# Patient Record
Sex: MALE | Race: WHITE
[De-identification: names, ages, dates, MRNs, and addresses within clinical notes are randomized per-mention and may not be internally consistent; named-entity substitution may affect disease eponyms.]

---

## 2017-02-01 ENCOUNTER — HOSPITAL ENCOUNTER (OUTPATIENT)
Dept: HOSPITAL 45 - C.LABMFLN | Age: 72
Discharge: HOME | End: 2017-02-01
Attending: FAMILY MEDICINE
Payer: COMMERCIAL

## 2017-02-01 DIAGNOSIS — N04.9: Primary | ICD-10-CM

## 2017-02-01 LAB
ANION GAP SERPL CALC-SCNC: 10 MMOL/L (ref 3–11)
APPEARANCE UR: CLEAR
BASOPHILS # BLD: 0.05 K/UL (ref 0–0.2)
BASOPHILS NFR BLD: 0.7 %
BILIRUB UR-MCNC: (no result) MG/DL
BUN SERPL-MCNC: 25 MG/DL (ref 7–18)
BUN/CREAT SERPL: 19 (ref 10–20)
CALCIUM SERPL-MCNC: 9 MG/DL (ref 8.5–10.1)
CHLORIDE SERPL-SCNC: 105 MMOL/L (ref 98–107)
CO2 SERPL-SCNC: 27 MMOL/L (ref 21–32)
COLLECT DURATION TIME UR: 24 HOURS
COLOR UR: YELLOW
COMPLETE: YES
CREAT SERPL-MCNC: 1.3 MG/DL (ref 0.6–1.4)
CREAT SERPL-MCNC: 1.3 MG/DL (ref 0.6–1.4)
CREAT UR-MCNC: 110 MG/DL
CREAT UR-MCNC: 76 MG/DL
EOSINOPHIL NFR BLD AUTO: 240 K/UL (ref 130–400)
GLUCOSE SERPL-MCNC: 95 MG/DL (ref 70–99)
HCT VFR BLD CALC: 40.7 % (ref 42–52)
IG%: 0.3 %
IMM GRANULOCYTES NFR BLD AUTO: 25.6 %
LYMPHOCYTES # BLD: 1.96 K/UL (ref 1.2–3.4)
MANUAL MICROSCOPIC REQUIRED?: NO
MCH RBC QN AUTO: 31.1 PG (ref 25–34)
MCHC RBC AUTO-ENTMCNC: 35.4 G/DL (ref 32–36)
MCV RBC AUTO: 87.9 FL (ref 80–100)
MONOCYTES NFR BLD: 9 %
NEUTROPHILS # BLD AUTO: 1.3 %
NEUTROPHILS NFR BLD AUTO: 63.1 %
NITRITE UR QL STRIP: (no result)
PATIENT HEIGHT: 188 CM
PH UR STRIP: 5.5 [PH] (ref 4.5–7.5)
PHOSPHATE SERPL-MCNC: 3.4 MG/DL (ref 2.5–4.9)
PMV BLD AUTO: 10.3 FL (ref 7.4–10.4)
POTASSIUM SERPL-SCNC: 3.7 MMOL/L (ref 3.5–5.1)
PROT UR STRIP-MCNC: 137.9 MG/DL (ref 0–11.9)
PROT UR STRIP-MCNC: 213.1 MG/DL (ref 0–11.9)
PROT UR STRIP.AUTO-MCNC: 3309.6 MG/24 HR (ref 0–149.1)
RBC # BLD AUTO: 4.63 M/UL (ref 4.7–6.1)
REVIEW REQ?: NO
SODIUM SERPL-SCNC: 142 MMOL/L (ref 136–145)
SP GR UR STRIP: 1.01 (ref 1–1.03)
URINE BILL WITH OR WITHOUT MIC: (no result)
URINE EPITHELIAL CELL AUTO: (no result) /LPF (ref 0–5)
URINE PROTIEN/CREAT RATIO: 1.9 (ref 0–0.2)
UROBILINOGEN UR-MCNC: (no result) MG/DL
WBC # BLD AUTO: 7.67 K/UL (ref 4.8–10.8)

## 2017-03-20 ENCOUNTER — HOSPITAL ENCOUNTER (OUTPATIENT)
Dept: HOSPITAL 45 - C.LABMFLN | Age: 72
Discharge: HOME | End: 2017-03-20
Attending: FAMILY MEDICINE
Payer: COMMERCIAL

## 2017-03-20 DIAGNOSIS — I10: ICD-10-CM

## 2017-03-20 DIAGNOSIS — E78.00: ICD-10-CM

## 2017-03-20 DIAGNOSIS — E11.9: ICD-10-CM

## 2017-03-20 DIAGNOSIS — R60.0: Primary | ICD-10-CM

## 2017-03-20 DIAGNOSIS — I48.91: ICD-10-CM

## 2017-03-20 LAB
ALBUMIN/GLOB SERPL: 0.6 {RATIO} (ref 0.9–2)
ALP SERPL-CCNC: 95 U/L (ref 45–117)
ALT SERPL-CCNC: 32 U/L (ref 12–78)
ANION GAP SERPL CALC-SCNC: 7 MMOL/L (ref 3–11)
AST SERPL-CCNC: 21 U/L (ref 15–37)
BUN SERPL-MCNC: 19 MG/DL (ref 7–18)
BUN/CREAT SERPL: 14.3 (ref 10–20)
CALCIUM SERPL-MCNC: 9 MG/DL (ref 8.5–10.1)
CHLORIDE SERPL-SCNC: 104 MMOL/L (ref 98–107)
CHOLEST/HDLC SERPL: 3.7 {RATIO}
CO2 SERPL-SCNC: 32 MMOL/L (ref 21–32)
CREAT SERPL-MCNC: 1.3 MG/DL (ref 0.6–1.4)
EST. AVERAGE GLUCOSE BLD GHB EST-MCNC: 183 MG/DL
GLOBULIN SER-MCNC: 4.8 GM/DL (ref 2.5–4)
GLUCOSE SERPL-MCNC: 65 MG/DL (ref 70–99)
GLUCOSE UR QL: 36 MG/DL
KETONES UR QL STRIP: 63 MG/DL
NITRITE UR QL STRIP: 176 MG/DL (ref 0–150)
PH UR: 134 MG/DL (ref 0–200)
POTASSIUM SERPL-SCNC: 3.8 MMOL/L (ref 3.5–5.1)
SODIUM SERPL-SCNC: 143 MMOL/L (ref 136–145)
VERY LOW DENSITY LIPOPROT CALC: 35 MG/DL

## 2017-03-24 NOTE — CODING QUERY MEDICAL NECESSITY
SUPPORTING DIAGNOSIS NEEDED



Dr. Jesus,



A supporting diagnosis is required for the test/procedure performed on this patient in 
order for us to be reimbursed by the patient's insurance. Please provide a supporting 
diagnosis for the following test/procedure listed below next to the test name along with 
your signature. 



*If there is no additional diagnosis for this patient that would support the following 
test/procedure please document that below next to the test/procedure.



Test(s)/Procedure(s) that require a supporting diagnosis:





* [id number] GLYCATED HEMOGLOBIN            DIAGNOSIS:





***DATE OF SERVICE: 3/20/17***





Provider Signature:  ______________________________  Date:  _______



Thank you  

Baljeet Dominguez

[facility] Information Management

Phone:  [phone]

Fax:  [phone]



Once completed, please kindly fax back to [phone]



For questions please call [phone]

## 2017-03-28 ENCOUNTER — HOSPITAL ENCOUNTER (OUTPATIENT)
Dept: HOSPITAL 45 - C.MRI | Age: 72
Discharge: HOME | End: 2017-03-28
Attending: FAMILY MEDICINE
Payer: COMMERCIAL

## 2017-03-28 DIAGNOSIS — M54.17: Primary | ICD-10-CM

## 2017-04-05 ENCOUNTER — HOSPITAL ENCOUNTER (OUTPATIENT)
Dept: HOSPITAL 45 - C.RAD | Age: 72
Discharge: HOME | End: 2017-04-05
Attending: FAMILY MEDICINE
Payer: COMMERCIAL

## 2017-04-05 DIAGNOSIS — M25.552: Primary | ICD-10-CM

## 2017-04-05 NOTE — DIAGNOSTIC IMAGING REPORT
LEFT HIP UNILATERAL 2 VIEWS



CLINICAL HISTORY: M25.552 Chronic left hip [id number] pain



COMPARISON: None.



DISCUSSION: Mild to moderate degenerative narrowing left hip joint space. No

evidence for acetabular protrusion. Slight sclerosis of the superior left

acetabular margin.



No abnormal periosteal reaction. There is no evidence for soft tissue swelling.



IMPRESSION: Moderate degenerative change. No acute process.







Electronically signed by:  Gabriel Pratt M.D.

4/5/2017 2:17 PM



Dictated Date/Time:  4/5/2017 2:17 PM

## 2017-05-08 ENCOUNTER — HOSPITAL ENCOUNTER (OUTPATIENT)
Dept: HOSPITAL 45 - C.LABMFLN | Age: 72
Discharge: HOME | End: 2017-05-08
Attending: FAMILY MEDICINE
Payer: COMMERCIAL

## 2017-05-08 DIAGNOSIS — E11.43: ICD-10-CM

## 2017-05-08 DIAGNOSIS — I48.91: Primary | ICD-10-CM

## 2017-05-08 DIAGNOSIS — E11.65: ICD-10-CM

## 2017-05-08 LAB
ANION GAP SERPL CALC-SCNC: 6 MMOL/L (ref 3–11)
BASOPHILS # BLD: 0.03 K/UL (ref 0–0.2)
BASOPHILS NFR BLD: 0.3 %
BUN SERPL-MCNC: 24 MG/DL (ref 7–18)
BUN/CREAT SERPL: 18.8 (ref 10–20)
CALCIUM SERPL-MCNC: 8.7 MG/DL (ref 8.5–10.1)
CHLORIDE SERPL-SCNC: 107 MMOL/L (ref 98–107)
CO2 SERPL-SCNC: 29 MMOL/L (ref 21–32)
COMPLETE: YES
CREAT SERPL-MCNC: 1.3 MG/DL (ref 0.6–1.4)
EOSINOPHIL NFR BLD AUTO: 256 K/UL (ref 130–400)
GLUCOSE SERPL-MCNC: 214 MG/DL (ref 70–99)
HCT VFR BLD CALC: 43.4 % (ref 42–52)
IG%: 1.7 %
IMM GRANULOCYTES NFR BLD AUTO: 22.3 %
LYMPHOCYTES # BLD: 2.41 K/UL (ref 1.2–3.4)
MCH RBC QN AUTO: 29.4 PG (ref 25–34)
MCHC RBC AUTO-ENTMCNC: 33.2 G/DL (ref 32–36)
MCV RBC AUTO: 88.6 FL (ref 80–100)
MONOCYTES NFR BLD: 8 %
NEUTROPHILS # BLD AUTO: 0.6 %
NEUTROPHILS NFR BLD AUTO: 67.1 %
PMV BLD AUTO: 9.8 FL (ref 7.4–10.4)
POTASSIUM SERPL-SCNC: 4.2 MMOL/L (ref 3.5–5.1)
RBC # BLD AUTO: 4.9 M/UL (ref 4.7–6.1)
SODIUM SERPL-SCNC: 142 MMOL/L (ref 136–145)
TSH SERPL-ACNC: 1.07 UIU/ML (ref 0.3–4.5)
WBC # BLD AUTO: 10.81 K/UL (ref 4.8–10.8)

## 2017-06-09 ENCOUNTER — HOSPITAL ENCOUNTER (OUTPATIENT)
Dept: HOSPITAL 45 - C.LABMFLN | Age: 72
Discharge: HOME | End: 2017-06-09
Attending: FAMILY MEDICINE
Payer: COMMERCIAL

## 2017-06-09 DIAGNOSIS — I48.91: ICD-10-CM

## 2017-06-09 DIAGNOSIS — I83.009: Primary | ICD-10-CM

## 2017-06-09 LAB
ANION GAP SERPL CALC-SCNC: 9 MMOL/L (ref 3–11)
BUN SERPL-MCNC: 27 MG/DL (ref 7–18)
BUN/CREAT SERPL: 18.2 (ref 10–20)
CALCIUM SERPL-MCNC: 9.3 MG/DL (ref 8.5–10.1)
CHLORIDE SERPL-SCNC: 104 MMOL/L (ref 98–107)
CO2 SERPL-SCNC: 29 MMOL/L (ref 21–32)
CREAT SERPL-MCNC: 1.5 MG/DL (ref 0.6–1.4)
GLUCOSE SERPL-MCNC: 119 MG/DL (ref 70–99)
INR PPP: 3.4 (ref 0.9–1.1)
POTASSIUM SERPL-SCNC: 4 MMOL/L (ref 3.5–5.1)
PROTHROMBIN TIME: 37.9 SECONDS (ref 9–12)
SODIUM SERPL-SCNC: 142 MMOL/L (ref 136–145)

## 2017-07-24 ENCOUNTER — HOSPITAL ENCOUNTER (OUTPATIENT)
Dept: HOSPITAL 45 - C.LABMFLN | Age: 72
Discharge: HOME | End: 2017-07-24
Attending: FAMILY MEDICINE
Payer: COMMERCIAL

## 2017-07-24 DIAGNOSIS — E11.65: ICD-10-CM

## 2017-07-24 DIAGNOSIS — I10: ICD-10-CM

## 2017-07-24 DIAGNOSIS — R53.82: ICD-10-CM

## 2017-07-24 DIAGNOSIS — I48.91: Primary | ICD-10-CM

## 2017-07-24 DIAGNOSIS — E11.43: ICD-10-CM

## 2017-07-24 DIAGNOSIS — E78.00: ICD-10-CM

## 2017-07-24 LAB
ALBUMIN/GLOB SERPL: 0.7 {RATIO} (ref 0.9–2)
ALP SERPL-CCNC: 97 U/L (ref 45–117)
ALT SERPL-CCNC: 36 U/L (ref 12–78)
ANION GAP SERPL CALC-SCNC: 6 MMOL/L (ref 3–11)
AST SERPL-CCNC: 21 U/L (ref 15–37)
BUN SERPL-MCNC: 21 MG/DL (ref 7–18)
BUN/CREAT SERPL: 17.7 (ref 10–20)
CALCIUM SERPL-MCNC: 9.3 MG/DL (ref 8.5–10.1)
CHLORIDE SERPL-SCNC: 104 MMOL/L (ref 98–107)
CHOLEST/HDLC SERPL: 3 {RATIO}
CO2 SERPL-SCNC: 30 MMOL/L (ref 21–32)
CREAT SERPL-MCNC: 1.2 MG/DL (ref 0.6–1.4)
GLOBULIN SER-MCNC: 4.3 GM/DL (ref 2.5–4)
GLUCOSE SERPL-MCNC: 104 MG/DL (ref 70–99)
GLUCOSE UR QL: 46 MG/DL
INR PPP: 2.2 (ref 0.9–1.1)
KETONES UR QL STRIP: 62 MG/DL
NITRITE UR QL STRIP: 145 MG/DL (ref 0–150)
PH UR: 137 MG/DL (ref 0–200)
POTASSIUM SERPL-SCNC: 3.9 MMOL/L (ref 3.5–5.1)
PROTHROMBIN TIME: 24.8 SECONDS (ref 9–12)
SODIUM SERPL-SCNC: 140 MMOL/L (ref 136–145)
VERY LOW DENSITY LIPOPROT CALC: 29 MG/DL

## 2017-07-25 LAB — EST. AVERAGE GLUCOSE BLD GHB EST-MCNC: 194 MG/DL

## 2017-07-31 ENCOUNTER — HOSPITAL ENCOUNTER (OUTPATIENT)
Dept: HOSPITAL 45 - C.LABMFLN | Age: 72
Discharge: HOME | End: 2017-07-31
Attending: INTERNAL MEDICINE
Payer: COMMERCIAL

## 2017-07-31 DIAGNOSIS — N18.9: Primary | ICD-10-CM

## 2017-07-31 LAB
APPEARANCE UR: CLEAR
BILIRUB UR-MCNC: (no result) MG/DL
COLOR UR: YELLOW
CREAT UR-MCNC: 130 MG/DL
MANUAL MICROSCOPIC REQUIRED?: NO
NITRITE UR QL STRIP: (no result)
PH UR STRIP: 5.5 [PH] (ref 4.5–7.5)
PROT UR STRIP-MCNC: 258.3 MG/DL (ref 0–11.9)
REVIEW REQ?: YES
SP GR UR STRIP: 1.02 (ref 1–1.03)
URINE BILL WITH OR WITHOUT MIC: (no result)
URINE EPITHELIAL CELL AUTO: >30 /LPF (ref 0–5)
URINE PROTIEN/CREAT RATIO: 2 (ref 0–0.2)
UROBILINOGEN UR-MCNC: (no result) MG/DL

## 2017-09-12 ENCOUNTER — HOSPITAL ENCOUNTER (OUTPATIENT)
Dept: HOSPITAL 45 - C.LABMFLN | Age: 72
Discharge: HOME | End: 2017-09-12
Attending: FAMILY MEDICINE
Payer: COMMERCIAL

## 2017-09-12 DIAGNOSIS — E11.43: ICD-10-CM

## 2017-09-12 DIAGNOSIS — R53.82: ICD-10-CM

## 2017-09-12 DIAGNOSIS — E11.65: ICD-10-CM

## 2017-09-12 DIAGNOSIS — I48.91: Primary | ICD-10-CM

## 2017-09-12 LAB
BASOPHILS # BLD: 0.03 K/UL (ref 0–0.2)
BASOPHILS NFR BLD: 0.4 %
COMPLETE: YES
EOSINOPHIL NFR BLD AUTO: 262 K/UL (ref 130–400)
HCT VFR BLD CALC: 40.8 % (ref 42–52)
IG%: 0.5 %
IMM GRANULOCYTES NFR BLD AUTO: 29.3 %
LYMPHOCYTES # BLD: 2.25 K/UL (ref 1.2–3.4)
MCH RBC QN AUTO: 30.3 PG (ref 25–34)
MCHC RBC AUTO-ENTMCNC: 34.8 G/DL (ref 32–36)
MCV RBC AUTO: 87.2 FL (ref 80–100)
MONOCYTES NFR BLD: 10.3 %
NEUTROPHILS # BLD AUTO: 1 %
NEUTROPHILS NFR BLD AUTO: 58.5 %
PMV BLD AUTO: 10.4 FL (ref 7.4–10.4)
RBC # BLD AUTO: 4.68 M/UL (ref 4.7–6.1)
WBC # BLD AUTO: 7.68 K/UL (ref 4.8–10.8)

## 2017-09-18 ENCOUNTER — HOSPITAL ENCOUNTER (OUTPATIENT)
Dept: HOSPITAL 45 - C.ULTR | Age: 72
Discharge: HOME | End: 2017-09-18
Attending: FAMILY MEDICINE
Payer: COMMERCIAL

## 2017-09-18 ENCOUNTER — HOSPITAL ENCOUNTER (OUTPATIENT)
Dept: HOSPITAL 45 - C.LABMFLN | Age: 72
Discharge: HOME | End: 2017-09-18
Attending: FAMILY MEDICINE
Payer: COMMERCIAL

## 2017-09-18 DIAGNOSIS — R32: Primary | ICD-10-CM

## 2017-09-18 DIAGNOSIS — N04.9: Primary | ICD-10-CM

## 2017-09-18 DIAGNOSIS — R32: ICD-10-CM

## 2017-09-18 LAB
APPEARANCE UR: CLEAR
BILIRUB UR-MCNC: (no result) MG/DL
COLOR UR: YELLOW
CREAT UR-MCNC: 180 MG/DL
MANUAL MICROSCOPIC REQUIRED?: NO
NITRITE UR QL STRIP: (no result)
PH UR STRIP: 5 [PH] (ref 4.5–7.5)
PROT UR STRIP-MCNC: 289.6 MG/DL (ref 0–11.9)
REVIEW REQ?: NO
SP GR UR STRIP: 1.02 (ref 1–1.03)
URINE BILL WITH OR WITHOUT MIC: (no result)
URINE EPITHELIAL CELL AUTO: (no result) /LPF (ref 0–5)
URINE PROTIEN/CREAT RATIO: 1.6 (ref 0–0.2)
UROBILINOGEN UR-MCNC: (no result) MG/DL

## 2017-09-18 NOTE — DIAGNOSTIC IMAGING REPORT
(LIZETTE) RETROPERITONEAL LTD



CLINICAL HISTORY: 72 years-old Male presenting with URINARY INCONTINENCE. 



TECHNIQUE: Real-time grayscale and limited color Doppler ultrasound imaging of

the bladder was performed. 



COMPARISON: None.



FINDINGS:



Bladder: Bladder jets not visualized. Bladder is incompletely distended limiting

diagnostic sensitivity. No gross evidence of bladder wall thickening. Post void

residual volume measures 16.7 mL, which is likely within the range of normal.



Other: None.



IMPRESSION:  

1.  Post void residual volume measures 16.7 mL, which is likely within the range

of normal. 







Electronically signed by:  Rodriguez Chaves M.D.

9/18/2017 5:23 PM



Dictated Date/Time:  9/18/2017 5:20 PM

## 2017-10-09 ENCOUNTER — HOSPITAL ENCOUNTER (OUTPATIENT)
Age: 72
Discharge: HOME | End: 2017-10-09
Attending: PSYCHIATRY & NEUROLOGY
Payer: COMMERCIAL

## 2017-10-09 DIAGNOSIS — Z86.73: ICD-10-CM

## 2017-10-09 DIAGNOSIS — R26.9: Primary | ICD-10-CM

## 2017-10-09 NOTE — DIAGNOSTIC IMAGING REPORT
BRAIN WITHOUT CONTRAST



CLINICAL HISTORY: 72 years-old Male presenting with ABNORMAL GAIT. 



TECHNIQUE: Multisequence, multiplanar MR imaging of the brain was performed

without the use of intravenous contrast. IV contrast: None.



COMPARISON: None.



FINDINGS: 

Proportional ventricular and sulcal prominence, likely age-related parenchymal

volume loss. Periventricular and subcortical white matter T2/FLAIR

hyperintensity, nonspecific but likely indicative of chronic small vessel

ischemic change. No mass effect or midline shift. No restricted diffusion to

suggest acute ischemia. Intrinsic T1 hyperintensity associated with a focal

defect in the right basal ganglia consistent with an old lacunar infarct with

mineralization. Resultant relative atrophy of the right caudate. No convincing

evidence of acute hemorrhage. No extra-axial fluid collection. 



T2 skull base flow voids preserved.    



Bone marrow signal intensity within the calvarium within normal limits. Minimal

polypoid mucosal thickening in the left maxillary sinus.



IMPRESSION:  

1.  No acute intracranial abnormality. 



2.  Chronic small vessel schema change.



3.  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







Electronically signed by:  Rodriguez Chaves M.D.

10/9/2017 5:32 PM



Dictated Date/Time:  10/9/2017 5:27 PM

## 2017-11-22 ENCOUNTER — HOSPITAL ENCOUNTER (OUTPATIENT)
Dept: HOSPITAL 45 - C.LABMFLN | Age: 72
Discharge: HOME | End: 2017-11-22
Attending: PHYSICIAN ASSISTANT
Payer: COMMERCIAL

## 2017-11-22 DIAGNOSIS — E11.65: Primary | ICD-10-CM

## 2017-11-23 LAB — EST. AVERAGE GLUCOSE BLD GHB EST-MCNC: 160 MG/DL

## 2018-01-24 ENCOUNTER — HOSPITAL ENCOUNTER (OUTPATIENT)
Dept: HOSPITAL 45 - C.LABMFLN | Age: 73
Discharge: HOME | End: 2018-01-24
Attending: INTERNAL MEDICINE
Payer: COMMERCIAL

## 2018-01-24 DIAGNOSIS — N04.9: Primary | ICD-10-CM

## 2018-01-24 LAB
ALBUMIN SERPL-MCNC: 3 GM/DL (ref 3.4–5)
BUN SERPL-MCNC: 19 MG/DL (ref 7–18)
CALCIUM SERPL-MCNC: 8.9 MG/DL (ref 8.5–10.1)
CO2 SERPL-SCNC: 30 MMOL/L (ref 21–32)
CREAT SERPL-MCNC: 1.37 MG/DL (ref 0.6–1.4)
GLUCOSE SERPL-MCNC: 126 MG/DL (ref 70–99)
PHOSPHATE SERPL-MCNC: 2.5 MG/DL (ref 2.5–4.9)
POTASSIUM SERPL-SCNC: 3.5 MMOL/L (ref 3.5–5.1)
SODIUM SERPL-SCNC: 140 MMOL/L (ref 136–145)

## 2018-02-06 ENCOUNTER — HOSPITAL ENCOUNTER (OUTPATIENT)
Dept: HOSPITAL 45 - C.LABMFLN | Age: 73
Discharge: HOME | End: 2018-02-06
Attending: FAMILY MEDICINE
Payer: COMMERCIAL

## 2018-02-06 DIAGNOSIS — I10: Primary | ICD-10-CM

## 2018-02-06 LAB
ALBUMIN SERPL-MCNC: 3.1 GM/DL (ref 3.4–5)
BUN SERPL-MCNC: 25 MG/DL (ref 7–18)
CALCIUM SERPL-MCNC: 9.1 MG/DL (ref 8.5–10.1)
CO2 SERPL-SCNC: 28 MMOL/L (ref 21–32)
CREAT SERPL-MCNC: 1.65 MG/DL (ref 0.6–1.4)
GLUCOSE SERPL-MCNC: 168 MG/DL (ref 70–99)
PHOSPHATE SERPL-MCNC: 2.9 MG/DL (ref 2.5–4.9)
POTASSIUM SERPL-SCNC: 3.8 MMOL/L (ref 3.5–5.1)
SODIUM SERPL-SCNC: 137 MMOL/L (ref 136–145)

## 2018-03-14 ENCOUNTER — HOSPITAL ENCOUNTER (OUTPATIENT)
Dept: HOSPITAL 45 - C.LABMFLN | Age: 73
Discharge: HOME | End: 2018-03-14
Attending: FAMILY MEDICINE
Payer: COMMERCIAL

## 2018-03-14 DIAGNOSIS — E11.9: Primary | ICD-10-CM

## 2018-03-14 DIAGNOSIS — N17.9: ICD-10-CM

## 2018-03-14 LAB
ALBUMIN SERPL-MCNC: 3 GM/DL (ref 3.4–5)
BUN SERPL-MCNC: 18 MG/DL (ref 7–18)
CALCIUM SERPL-MCNC: 8.8 MG/DL (ref 8.5–10.1)
CO2 SERPL-SCNC: 29 MMOL/L (ref 21–32)
CREAT SERPL-MCNC: 1.26 MG/DL (ref 0.6–1.4)
GLUCOSE SERPL-MCNC: 158 MG/DL (ref 70–99)
HBA1C MFR BLD: 7.6 % (ref 4.5–5.6)
PHOSPHATE SERPL-MCNC: 3.1 MG/DL (ref 2.5–4.9)
POTASSIUM SERPL-SCNC: 3.5 MMOL/L (ref 3.5–5.1)
SODIUM SERPL-SCNC: 140 MMOL/L (ref 136–145)

## 2018-05-21 ENCOUNTER — HOSPITAL ENCOUNTER (OUTPATIENT)
Dept: HOSPITAL 45 - C.LABMFLN | Age: 73
Discharge: HOME | End: 2018-05-21
Attending: FAMILY MEDICINE
Payer: COMMERCIAL

## 2018-05-21 DIAGNOSIS — I48.91: Primary | ICD-10-CM

## 2018-05-21 DIAGNOSIS — R60.0: ICD-10-CM

## 2018-05-21 DIAGNOSIS — R06.09: ICD-10-CM

## 2018-05-21 DIAGNOSIS — R06.00: ICD-10-CM

## 2018-05-21 LAB
BASOPHILS # BLD: 0.06 K/UL (ref 0–0.2)
BASOPHILS NFR BLD: 0.7 %
BUN SERPL-MCNC: 26 MG/DL (ref 7–18)
CALCIUM SERPL-MCNC: 9.2 MG/DL (ref 8.5–10.1)
CO2 SERPL-SCNC: 31 MMOL/L (ref 21–32)
CREAT SERPL-MCNC: 1.5 MG/DL (ref 0.6–1.4)
EOS ABS #: 0.13 K/UL (ref 0–0.5)
EOSINOPHIL NFR BLD AUTO: 256 K/UL (ref 130–400)
GLUCOSE SERPL-MCNC: 47 MG/DL (ref 70–99)
HCT VFR BLD CALC: 42.2 % (ref 42–52)
HGB BLD-MCNC: 14.8 G/DL (ref 14–18)
IG#: 0.03 K/UL (ref 0–0.02)
IMM GRANULOCYTES NFR BLD AUTO: 25.2 %
INR PPP: 2 (ref 0.9–1.1)
LYMPHOCYTES # BLD: 2.1 K/UL (ref 1.2–3.4)
MCH RBC QN AUTO: 31 PG (ref 25–34)
MCHC RBC AUTO-ENTMCNC: 35.1 G/DL (ref 32–36)
MCV RBC AUTO: 88.5 FL (ref 80–100)
MONO ABS #: 0.88 K/UL (ref 0.11–0.59)
MONOCYTES NFR BLD: 10.6 %
NEUT ABS #: 5.12 K/UL (ref 1.4–6.5)
NEUTROPHILS # BLD AUTO: 1.6 %
NEUTROPHILS NFR BLD AUTO: 61.5 %
PMV BLD AUTO: 10.6 FL (ref 7.4–10.4)
POTASSIUM SERPL-SCNC: 3.5 MMOL/L (ref 3.5–5.1)
RED CELL DISTRIBUTION WIDTH CV: 13.5 % (ref 11.5–14.5)
RED CELL DISTRIBUTION WIDTH SD: 44.5 FL (ref 36.4–46.3)
SODIUM SERPL-SCNC: 142 MMOL/L (ref 136–145)
WBC # BLD AUTO: 8.32 K/UL (ref 4.8–10.8)

## 2018-07-23 ENCOUNTER — HOSPITAL ENCOUNTER (OUTPATIENT)
Dept: HOSPITAL 45 - C.LABMFLN | Age: 73
Discharge: HOME | End: 2018-07-23
Attending: FAMILY MEDICINE
Payer: COMMERCIAL

## 2018-07-23 DIAGNOSIS — E11.628: ICD-10-CM

## 2018-07-23 DIAGNOSIS — I48.91: Primary | ICD-10-CM

## 2018-07-23 DIAGNOSIS — I10: ICD-10-CM

## 2018-07-23 DIAGNOSIS — L03.115: ICD-10-CM

## 2018-07-23 DIAGNOSIS — M79.671: ICD-10-CM

## 2018-07-23 LAB
BASOPHILS # BLD: 0.03 K/UL (ref 0–0.2)
BASOPHILS NFR BLD: 0.3 %
BUN SERPL-MCNC: 24 MG/DL (ref 7–18)
CALCIUM SERPL-MCNC: 8.4 MG/DL (ref 8.5–10.1)
CO2 SERPL-SCNC: 28 MMOL/L (ref 21–32)
CREAT SERPL-MCNC: 1.62 MG/DL (ref 0.6–1.4)
EOS ABS #: 0.1 K/UL (ref 0–0.5)
EOSINOPHIL NFR BLD AUTO: 204 K/UL (ref 130–400)
GLUCOSE SERPL-MCNC: 239 MG/DL (ref 70–99)
HCT VFR BLD CALC: 38.7 % (ref 42–52)
HGB BLD-MCNC: 13 G/DL (ref 14–18)
IG#: 0.04 K/UL (ref 0–0.02)
IMM GRANULOCYTES NFR BLD AUTO: 23.2 %
INR PPP: 2.8 (ref 0.9–1.1)
LYMPHOCYTES # BLD: 2.16 K/UL (ref 1.2–3.4)
MCH RBC QN AUTO: 29.6 PG (ref 25–34)
MCHC RBC AUTO-ENTMCNC: 33.6 G/DL (ref 32–36)
MCV RBC AUTO: 88.2 FL (ref 80–100)
MONO ABS #: 0.78 K/UL (ref 0.11–0.59)
MONOCYTES NFR BLD: 8.4 %
NEUT ABS #: 6.21 K/UL (ref 1.4–6.5)
NEUTROPHILS # BLD AUTO: 1.1 %
NEUTROPHILS NFR BLD AUTO: 66.6 %
PMV BLD AUTO: 11.1 FL (ref 7.4–10.4)
POTASSIUM SERPL-SCNC: 3.9 MMOL/L (ref 3.5–5.1)
RED CELL DISTRIBUTION WIDTH CV: 14 % (ref 11.5–14.5)
RED CELL DISTRIBUTION WIDTH SD: 45.2 FL (ref 36.4–46.3)
SODIUM SERPL-SCNC: 138 MMOL/L (ref 136–145)
URATE SERPL-MCNC: 7.2 MG/DL (ref 2.6–7.2)
WBC # BLD AUTO: 9.32 K/UL (ref 4.8–10.8)

## 2018-07-27 ENCOUNTER — HOSPITAL ENCOUNTER (INPATIENT)
Dept: HOSPITAL 45 - C.EDB | Age: 73
LOS: 3 days | Discharge: HOME | DRG: 394 | End: 2018-07-30
Attending: FAMILY MEDICINE | Admitting: STUDENT IN AN ORGANIZED HEALTH CARE EDUCATION/TRAINING PROGRAM
Payer: COMMERCIAL

## 2018-07-27 VITALS
HEIGHT: 73 IN | BODY MASS INDEX: 32.4 KG/M2 | WEIGHT: 244.49 LBS | HEIGHT: 73 IN | BODY MASS INDEX: 32.4 KG/M2 | WEIGHT: 244.49 LBS

## 2018-07-27 VITALS
DIASTOLIC BLOOD PRESSURE: 117 MMHG | SYSTOLIC BLOOD PRESSURE: 231 MMHG | OXYGEN SATURATION: 98 % | HEART RATE: 93 BPM | TEMPERATURE: 98.6 F

## 2018-07-27 DIAGNOSIS — Z86.718: ICD-10-CM

## 2018-07-27 DIAGNOSIS — L03.116: ICD-10-CM

## 2018-07-27 DIAGNOSIS — D62: ICD-10-CM

## 2018-07-27 DIAGNOSIS — K92.1: ICD-10-CM

## 2018-07-27 DIAGNOSIS — K59.00: ICD-10-CM

## 2018-07-27 DIAGNOSIS — K64.8: Primary | ICD-10-CM

## 2018-07-27 DIAGNOSIS — E87.6: ICD-10-CM

## 2018-07-27 DIAGNOSIS — K57.30: ICD-10-CM

## 2018-07-27 DIAGNOSIS — D68.59: ICD-10-CM

## 2018-07-27 DIAGNOSIS — M48.062: ICD-10-CM

## 2018-07-27 DIAGNOSIS — Z79.01: ICD-10-CM

## 2018-07-27 DIAGNOSIS — Z79.84: ICD-10-CM

## 2018-07-27 DIAGNOSIS — N18.9: ICD-10-CM

## 2018-07-27 DIAGNOSIS — E11.21: ICD-10-CM

## 2018-07-27 DIAGNOSIS — I12.9: ICD-10-CM

## 2018-07-27 DIAGNOSIS — N40.0: ICD-10-CM

## 2018-07-27 DIAGNOSIS — E78.5: ICD-10-CM

## 2018-07-27 LAB
ALBUMIN SERPL-MCNC: 2.8 GM/DL (ref 3.4–5)
ALP SERPL-CCNC: 84 U/L (ref 45–117)
ALT SERPL-CCNC: 19 U/L (ref 12–78)
AST SERPL-CCNC: 20 U/L (ref 15–37)
BASOPHILS # BLD: 0.05 K/UL (ref 0–0.2)
BASOPHILS NFR BLD: 0.6 %
BUN SERPL-MCNC: 26 MG/DL (ref 7–18)
CA-I BLD-SCNC: 1.17 MMOL/L (ref 1.12–1.32)
CALCIUM SERPL-MCNC: 8.6 MG/DL (ref 8.5–10.1)
CO2 SERPL-SCNC: 22 MMOL/L (ref 21–32)
CREAT BLD-MCNC: 1.5 MG/DL (ref 0.6–1.3)
CREAT SERPL-MCNC: 1.53 MG/DL (ref 0.6–1.4)
EOS ABS #: 0.16 K/UL (ref 0–0.5)
EOSINOPHIL NFR BLD AUTO: 258 K/UL (ref 130–400)
GLUCOSE SERPL-MCNC: 166 MG/DL (ref 70–99)
HCT VFR BLD CALC: 25.5 % (ref 42–52)
HCT VFR BLD CALC: 27.7 % (ref 42–52)
HGB BLD-MCNC: 8.8 G/DL (ref 14–18)
HGB BLD-MCNC: 9.2 G/DL (ref 14–18)
IG#: 0.05 K/UL (ref 0–0.02)
IMM GRANULOCYTES NFR BLD AUTO: 29.8 %
INR PPP: 3.2 (ref 0.9–1.1)
ISTAT POTASSIUM: 4 MEQ/L (ref 3.3–5)
LYMPHOCYTES # BLD: 2.37 K/UL (ref 1.2–3.4)
MCH RBC QN AUTO: 28.8 PG (ref 25–34)
MCHC RBC AUTO-ENTMCNC: 33.2 G/DL (ref 32–36)
MCV RBC AUTO: 86.6 FL (ref 80–100)
MONO ABS #: 0.74 K/UL (ref 0.11–0.59)
MONOCYTES NFR BLD: 9.3 %
NEUT ABS #: 4.57 K/UL (ref 1.4–6.5)
NEUTROPHILS # BLD AUTO: 2 %
NEUTROPHILS NFR BLD AUTO: 57.7 %
PMV BLD AUTO: 9.9 FL (ref 7.4–10.4)
POTASSIUM SERPL-SCNC: 4 MMOL/L (ref 3.5–5.1)
PROT SERPL-MCNC: 7 GM/DL (ref 6.4–8.2)
PTT PATIENT: 35.1 SECONDS (ref 21–31)
RED CELL DISTRIBUTION WIDTH CV: 13.8 % (ref 11.5–14.5)
RED CELL DISTRIBUTION WIDTH SD: 43.7 FL (ref 36.4–46.3)
SODIUM BLD-SCNC: 142 MEQ/L (ref 135–144)
SODIUM SERPL-SCNC: 141 MMOL/L (ref 136–145)
WBC # BLD AUTO: 7.94 K/UL (ref 4.8–10.8)

## 2018-07-27 RX ADMIN — INSULIN ASPART SCH UNITS: 100 INJECTION, SOLUTION INTRAVENOUS; SUBCUTANEOUS at 23:43

## 2018-07-27 RX ADMIN — SIMETHICONE SCH MG: 80 TABLET, CHEWABLE ORAL at 23:41

## 2018-07-27 RX ADMIN — METOPROLOL TARTRATE PRN MG: 5 INJECTION, SOLUTION INTRAVENOUS at 23:14

## 2018-07-27 RX ADMIN — PANTOPRAZOLE SODIUM SCH MLS/HR: 40 INJECTION, POWDER, FOR SOLUTION INTRAVENOUS at 23:13

## 2018-07-27 NOTE — HISTORY AND PHYSICAL
History & Physical


Date & Time of Service:


Jul 27, 2018 at 20:36


Chief Complaint:


Cellulitis, Internal Bleeding, Referred By Dr


Primary Care Physician:


Fabiano Jesus M.D.


History of Present Illness


Source:  patient, family


Patient is a 73 year old male with a past medical history of Diabetes, 

Hypertension, and Protein C Deficiency with 2 previous DVTs in the left leg 

that presents with dark stools x 3 days. The patient states that he first 

noticed his dark stools on Tuesday morning and they have continued to be dark 

throughout the week. The patient states that they have appeared black with 

blood surrounding them in the toilet. He also appreciates his bowel movements 

have been looser than normal. He is normally constipated and had a hard bowel 

movement Monday evening where he had to strain but has not had significant 

bleeding with wiping since. The patient is on Coumadin for a history of 

multiple DVTs and Protein C Deficiency. His last INR this week was around 2 (

patient states does not recall exact number).





They patient has also been on antibiotics this week for a left lower extremity 

cellulitis. His symptoms were initially believed to be secondary to Doxycycline 

use so they switched his antibiotics to Clindamycin. The patient states that 

there was erythema over the dorsum of his foot along with pain when moving his 

foot initially. The pain improved with the Clindamycin but her continues to 

have some residual erythema and swelling of the left lower extremity.





He appreciates his blood pressures over the last 2 weeks have ranged from 170-

220 systolic, and called his PCP who told him to "try to make it better" at 

home.





The patient denies an recent fevers, chills, sweats, abdominal pain, diarrhea, 

vomiting, nausea, or any other acute complaints. He states that he has been 

fatigued but this has been chronic over the last year.





Past Medical/Surgical History


Medical Problems:


(1) Bleeding on Coumadin


(2) Diabetes


(3) GI bleed


(4) Hypertension


(5) Kidney disease


(6) Lumbar stenosis with neurogenic claudication


(7) Pneumonia


(8) Skin problem


(9) Syncope








Family History





Cancer





Social History


Smoking Status:  Never Smoker


Smokeless Tobacco Use:  No


Alcohol Use:  none


Drug Use:  none


Occupational Status:  retired





Immunizations


History of Influenza Vaccine:  Unknown


History of Tetanus Vaccine?:  Unknown


History of Pneumococcal:  Unknown


History of Hepatitis B Vaccine:  Unknown





Allergies


Coded Allergies:  


     Penicillins (Verified  Allergy, Unknown, RASH FROM INJECTED FORM ONLY, po 

OK, 12/14/15)


 PT CAN TOLERATE ALL ORAL PCN MEDS


     Pravastatin (Unverified  Adverse Reaction, Unknown, LEGS GET WEAK, 12/14/15

)





Home Medications


Scheduled


Acetaminophen (Tylenol), 1-2 TABS PO DAILY


Amlodipine (Norvasc), 10 MG PO QPM


Aspirin (Aspirin Ec), 81 MG PO HOLD


Atorvastatin (Lipitor), 20 MG PO HS


Bumetanide (Bumetanide), 2 MG PO DAILY


Carbidopa-Levodopa (Carbidopa/Levodopa), 1 TAB PO DAILY


Clindamycin HCl (Clindamycin HCl), 300 MG PO QID


Coenzyme Q10 (Ubidecarenone) (Coq10), 200 MG QAM


Ginkgo Biloba (Ginkgo Biloba), 120 MG PO DAILY


Insulin Aspart (Novolog Flexpen), 30 UNITS SQ QAM


Insulin Aspart (Novolog Flexpen), 35 UNITS SQ BIDM


Insulin Glargine (Toujeo Solostar), 65-75 UNITS SQ HS


Losartan Potassium (Cozaar), 100 MG PO DAILY


Metformin HCl (Metformin HCl), 500 MG PO BID


Multiple Vitamin (Multi Vitamin Daily), 1 TAB PO QAM


Sotalol Hcl (Sotalol Hcl), 120 MG PO BID


Terazosin Hcl (Hytrin), 10 MG PO HS


Vitamin B Cmplx/Vitc/Folic Ac (Nephrocaps), 1 CAP PO DAILY


Warfarin Sod (Jantoven), 5 MG PO MWF


Warfarin Sod (Jantoven), 2.5 MG PO 4XWK


[Fluocinonide Sol], 1 DOSE TOP PRN





Scheduled PRN


Fluticasone Propionate (Nasal) (Flonase Allergy Relief), 2 SPRAYS ISABEL DAILY PRN 

for Nasal Congestion


Hydrocodone/Acetaminophen 5MG/325MG (Norco 5MG/325MG), 1-2 TABS PO QID PRN for 

Pain





Review of Systems


Constitutional:  + fatigue, No fever, No chills, No sweats, No weight loss


Respiratory:  No cough, No sputum, No wheezing, No shortness of breath, No 

dyspnea on exertion


Cardiovascular:  No chest pain, No palpitations


Abdomen:  + GI bleeding, No pain, No nausea, No vomiting, No diarrhea, No 

constipation


Musculoskeletal:  + swelling, No joint pain, No calf pain


Neurologic:  No paralysis, No weakness


Psychiatric:  No anxiety, No insomnia


Integumentary:  + rash, No itch





Physical Exam


Vital Signs











  Date Time  Temp Pulse Resp B/P (MAP) Pulse Ox O2 Delivery O2 Flow Rate FiO2


 


7/27/18 19:30  83 20 188/105 97 Room Air  


 


7/27/18 19:26  84      


 


7/27/18 17:56 36.9 88 18 216/89 98 Room Air  








General Appearance:  WD/WN, + obese


Head:  normocephalic, atraumatic


Eyes:  normal inspection, sclerae normal


Neck:  supple, no carotid bruits


Respiratory/Chest:  chest non-tender, lungs clear, normal breath sounds, no 

respiratory distress, no accessory muscle use


Cardiovascular:  regular rate, rhythm, no edema, no gallop, no murmur


Abdomen/GI:  normal bowel sounds, non tender, soft, + distended (Appear 

distended secondary to gas), + fecal occult blood


Extremities/Musculoskelatal:  + pedal edema, + swelling (2+ lower extremity 

edema), + pertinent finding (Erytnema over the dosrum of the right foot 

extending to the lower leg)


Neurologic/Psych:  alert, normal mood/affect, oriented x 3





Diagnostics


Laboratory Results





Results Past 24 Hours








Test


  7/27/18


18:26 7/27/18


18:40 Range/Units


 


 


White Blood Count 7.94  4.8-10.8  K/uL


 


Red Blood Count 3.20  4.7-6.1  M/uL


 


Hemoglobin 9.2  14.0-18.0  g/dL


 


Hematocrit 27.7  42-52  %


 


Mean Corpuscular Volume 86.6    fL


 


Mean Corpuscular Hemoglobin 28.8  25-34  pg


 


Mean Corpuscular Hemoglobin


Concent 33.2


  


  32-36  g/dl


 


 


Platelet Count 258  130-400  K/uL


 


Mean Platelet Volume 9.9  7.4-10.4  fL


 


Neutrophils (%) (Auto) 57.7   %


 


Lymphocytes (%) (Auto) 29.8   %


 


Monocytes (%) (Auto) 9.3   %


 


Eosinophils (%) (Auto) 2.0   %


 


Basophils (%) (Auto) 0.6   %


 


Neutrophils # (Auto) 4.57  1.4-6.5  K/uL


 


Lymphocytes # (Auto) 2.37  1.2-3.4  K/uL


 


Monocytes # (Auto) 0.74  0.11-0.59  K/uL


 


Eosinophils # (Auto) 0.16  0-0.5  K/uL


 


Basophils # (Auto) 0.05  0-0.2  K/uL


 


RDW Standard Deviation 43.7  36.4-46.3  fL


 


RDW Coefficient of Variation 13.8  11.5-14.5  %


 


Immature Granulocyte % (Auto) 0.6   %


 


Immature Granulocyte # (Auto) 0.05  0.00-0.02  K/uL


 


Prothrombin Time


  33.1


  


  9.0-12.0


SECONDS


 


Prothromb Time International


Ratio 3.2


  


  0.9-1.1  


 


 


Activated Partial


Thromboplast Time 35.1


  


  21.0-31.0


SECONDS


 


Partial Thromboplastin Ratio 1.4   


 


Sodium Level 141  136-145  mmol/L


 


Potassium Level 4.0  3.5-5.1  mmol/L


 


Chloride Level 110    mmol/L


 


Carbon Dioxide Level 22  21-32  mmol/L


 


Anion Gap 9.0 16.0 16-25  mmol/L


 


Blood Urea Nitrogen 26  7-18  mg/dl


 


Creatinine


  1.53


  


  0.60-1.40


mg/dl


 


Est Creatinine Clear Calc


Drug Dose 55.4


  


   ml/min


 


 


Estimated GFR (


American) 50.8


  


   


 


 


Estimated GFR (Non-


American 43.8


  


   


 


 


BUN/Creatinine Ratio 17.3  10-20  


 


Random Glucose 166  70-99  mg/dl


 


Calcium Level 8.6  8.5-10.1  mg/dl


 


Total Bilirubin 0.5  0.2-1  mg/dl


 


Direct Bilirubin 0.2  0-0.2  mg/dl


 


Aspartate Amino Transf


(AST/SGOT) 20


  


  15-37  U/L


 


 


Alanine Aminotransferase


(ALT/SGPT) 19


  


  12-78  U/L


 


 


Alkaline Phosphatase 84    U/L


 


Troponin I < 0.015  0-0.045  ng/ml


 


Total Protein 7.0  6.4-8.2  gm/dl


 


Albumin 2.8  3.4-5.0  gm/dl


 


Bedside Hemoglobin  8.5 14.0-18.0  g/dl


 


Bedside Hematocrit  25 42-52  %


 


Bedside Sodium  142 135-144  mEq/L


 


Bedside Potassium  4.0 3.3-5.0  mEq/L


 


Bedside Chloride  108 101-112  mEq/L


 


Bedside Total CO2  22 24-31  mEq/l


 


Bedside Blood Urea Nitrogen  24 7-18  mg/dl


 


Bedside Creatinine  1.5 0.6-1.3  mg/dl


 


Bedside Glucose (other)  172 70-99  mg/dl


 


Bedside Ionized Calcium (Carly)


  


  1.17


  1.12-1.32


mmol/l











Impression


Assessment and Plan


Patient is a 73 year old male with a past medical history of Diabetes, 

Hypertension, and Protein C Deficiency with 2 previous DVTs in the left leg 

that presents with dark stools x 3 days





GI Bleeding


- Hemoccult Positive


- Hold Coumadin and Aspirin


- INR 3.2 --> 10mg Vitamin K --> Repeat INR Tomorrow AM


- Repeat H/H q4h --> Current Hgb 8.8


- NPO


- Protonix IV Bolus + Drip


- GI Consult


- Admit Telemetry





Lower Extremity Cellulitis


- Zosyn IV


- Holding previous home PO Clindamycin





Protein C Deficiency with History of DVT


- Hold Coumadin


- Left lower extremity doppler US





Diabetes


- Hold home Metformin 


- Novolog SSI with BSG Checks AC/HS


- Continue home Novolog dosing regimen (30 units qAM + 35 units at lunch and 

dinner)





Hypertension


- Holding home PO Antihypertensives --> Will require adjustments during 

hospitalization when resuming PO meds due to poor control prior to discharge


- PRN Lopressor and Hydralazine ordered





HLD


- Holding Lipitor while NPO





CKD Stage 3


- Cr of 1.5


- Hold home Bumex while NPO





DVT


- SCDs





BPH


- Hold Terazosin while NPO





Code Status 


- Full Resuscitation








Attending addendum:








I have physically seen this patient, have supervised the medical residents 

activities, and agree with the H&P unless as otherwise noted.





Assessment and Plan:





Upper GI bleed--


N.p.o. status.


Hold Coumadin and aspirin.


Give 10 mg of vitamin K for INR 3.2 and repeat INR post treatment.


Serial H&H's every 4-6 hours.


Pantoprazole IV bolus and drip.


Follow on monitored bed.


Consult gastroenterology.





Protein C deficiency/DVT history/chronic anticoagulation with Coumadin--


Reverse anticoagulation with vitamin K for now.


Once source of bleeding is found and treated, can bridge with Lovenox to 

Coumadin, or consider starting medication such as Xarelto or Eliquis.





Remainder of notes and orders as above.





Advanced Directives


Existing Advance Directive:  No


Existing Living Will:  No


Existing Power of :  No





Resuscitation Status


Full Code





VTE Prophylaxis


Will order VTE Prophylaxis:  Yes


Reason for no VTE drug order:  Contraindicated





Social Service Consult


None Apply





Resident Tracking


Resident Involvement:  Resident Care Provided


Care Provided:  Adult Hospital Medicine

## 2018-07-27 NOTE — DIAGNOSTIC IMAGING REPORT
RIGHT LOWER EXTREMITY VENOUS DOPPLER



CLINICAL HISTORY: Lower Extremity Swelling, Protein C Deficiency.    



COMPARISON STUDY:  No previous studies for comparison. 



TECHNIQUE:  Sonography of the deep venous system of the right lower extremity

was performed. Compression and augmentation were evaluated.



FINDINGS:  The right common femoral, superficial femoral and popliteal veins

were compressible. Augmentation was normal. Flow was shown within the deep calf

vessels.



IMPRESSION: No evidence of deep venous thrombus within the right lower

extremity.







Electronically signed by:  Sharif Ramesh M.D.

7/27/2018 10:30 PM



Dictated Date/Time:  7/27/2018 10:29 PM

## 2018-07-27 NOTE — EMERGENCY ROOM VISIT NOTE
History


Report prepared by Eunice:  Jorge Sauer


Under the Supervision of:  Dr. Yuri Carlos M.D.


First contact with patient:  18:03


Chief Complaint:  INFECTION


Stated Complaint:  CELLULITIS, INTERNAL BLEEDING, REFERRED BY 





History of Present Illness


The patient is a 73 year old male who presents to the Emergency Room with 

complaints of intermittent black stools beginning 3 days ago. He denies seeing 

any red. He notes that 2 days ago he felt lightheaded like he was going to pass 

out, a feeling which has since resolved. He reports that he had similar 

symptoms 3 years ago. He notes that he had a scope done that did not show any 

ulcers. He also reports DVT history in 1993 and 1997. He states that he has 

Protein C deficiency and is on Coumadin, and has not had any recent dosage 

changes. He reports cellulitis of the right foot that is improving, for which 

he was taking doxycycline and then switched to clindamycin. He notes that he 

has been taking baby aspirin starting 2 months ago, takes blood pressure 

medication, and does not take ibuprofen or Aleve. He notes a family history of 

colon cancer and states that he had a colonoscopy that did not show anything. 

The patient denies nosebleeds, headaches, chest pain, shortness of breath, 

wheezing, or abdominal pain.  Dr. Ann Marie Yost is his PCP. He states 

that he does not drink alcohol.





   Source of History:  patient


   Onset:  3 days ago


   Position:  other (GI system)


   Symptom Intensity:  moderate


   Quality:  other (black stool)


   Timing:  intermittent


   Modifying Factors (Relieving):  other (none)


   Associated Symptoms:  + melena, No headache, No chest pain, No SOB, No 

abdominal pain, No hematochezia


Note:


lightheaded, like he is going to pass out





denies nosebleeds





Review of Systems


See HPI for pertinent positives & negatives. A total of 10 systems reviewed and 

were otherwise negative.





Past Medical & Surgical


Medical Problems:


(1) Bleeding on Coumadin


(2) Diabetes


(3) GI bleed


(4) Hypertension


(5) Kidney disease


(6) Pneumonia


(7) Skin problem








Family History





Cancer





Social History


Smoking Status:  Never Smoker





Current/Historical Medications


Scheduled


Amlodipine (Norvasc), 10 MG PO QPM


Finasteride (Proscar), 5 MG PO QPM


Fish Oil (Omega-3), 1 CAP PO QAM


Fluticasone Propionate (Nasal) (Flonase), 2 SPRAYS ISABEL BID


Ginkgo Biloba (Ginkgo Biloba), 120 MG PO QAM


Glimepiride (Glimepiride), 1 MG PO TIDM


Glucosamine Sulfate (Glucosamine), 1 TAB PO QAM


Hydralazine Hcl (Apresoline), 10 MG PO QID


Hydrochlorothiazide (Hctz), 25 MG PO QAM


Lisinopril (Prinivil), 40 MG PO QAM


Magnesium Oxide (Magnesium), 400 MG PO QAM


Metformin HCl (Metformin HCl), 500 MG PO TIDM


Multiple Vitamin (Multi Vitamin Daily), 1 TAB PO QAM


Sotalol Hcl (Sotalol Hcl), 120 MG PO BID


Vitamin B Cmplx/Vitc/Folic Ac (Nephrocaps), 1 CAP PO QAM


Warfarin Sodium (Warfarin Sodium), 5 MG PO 4XWK


Warfarin Sodium (Warfarin Sodium), 2.5 MG PO 3XWK


[Co Q10], 200 MG PO QPM


[Fluocinonide Sol], 1 DOSE TOP PRN





Allergies


Coded Allergies:  


     Penicillins (Verified  Allergy, Unknown, RASH FROM INJECTED FORM ONLY, po 

OK, 12/14/15)


 PT CAN TOLERATE ALL ORAL PCN MEDS


     Pravastatin (Unverified  Adverse Reaction, Unknown, LEGS GET WEAK, 12/14/15

)





Physical Exam


Vital Signs











  Date Time  Temp Pulse Resp B/P (MAP) Pulse Ox O2 Delivery O2 Flow Rate FiO2


 


7/27/18 19:30  83 20 188/105 97 Room Air  


 


7/27/18 19:26  84      


 


7/27/18 17:56 36.9 88 18 216/89 98 Room Air  











Physical Exam





Constitutional: Vital signs reviewed.


Eyes: Pupils are equal round reactive to light.  Conjunctiva are noninjected.  


ENT: Pharynx is clear without erythema or exudate.  Mucous membranes are moist.

  Neck supple without meningeal signs.


Respiratory: Clear to auscultation bilaterally.  Breath sounds are equal 

bilaterally. 


Cardiovascular: Regular rate and rhythm.  No rubs or gallops.


GI: Soft, nondistended and nontender.  Bowel sounds are present.


Musculoskeletal:  Swelling to right foot with erythema and increased warmth


Integumentary: No cyanosis.


Neurological: The patient is awake and alert.  No focal deficits.


Psychiatric: Normal affect.


 Rectal: maroon colored stools, guaiac positive





Medical Decision & Procedures


Laboratory Results


7/27/18 18:26








Red Blood Count 3.20, Mean Corpuscular Volume 86.6, Mean Corpuscular Hemoglobin 

28.8, Mean Corpuscular Hemoglobin Concent 33.2, Mean Platelet Volume 9.9, 

Neutrophils (%) (Auto) 57.7, Lymphocytes (%) (Auto) 29.8, Monocytes (%) (Auto) 

9.3, Eosinophils (%) (Auto) 2.0, Basophils (%) (Auto) 0.6, Neutrophils # (Auto) 

4.57, Lymphocytes # (Auto) 2.37, Monocytes # (Auto) 0.74, Eosinophils # (Auto) 

0.16, Basophils # (Auto) 0.05





7/27/18 18:26

















Test


  7/27/18


18:26 7/27/18


18:40


 


White Blood Count


  7.94 K/uL


(4.8-10.8) 


 


 


Red Blood Count


  3.20 M/uL


(4.7-6.1) 


 


 


Hemoglobin


  9.2 g/dL


(14.0-18.0) 


 


 


Hematocrit 27.7 % (42-52)  


 


Mean Corpuscular Volume


  86.6 fL


() 


 


 


Mean Corpuscular Hemoglobin


  28.8 pg


(25-34) 


 


 


Mean Corpuscular Hemoglobin


Concent 33.2 g/dl


(32-36) 


 


 


Platelet Count


  258 K/uL


(130-400) 


 


 


Mean Platelet Volume


  9.9 fL


(7.4-10.4) 


 


 


Neutrophils (%) (Auto) 57.7 %  


 


Lymphocytes (%) (Auto) 29.8 %  


 


Monocytes (%) (Auto) 9.3 %  


 


Eosinophils (%) (Auto) 2.0 %  


 


Basophils (%) (Auto) 0.6 %  


 


Neutrophils # (Auto)


  4.57 K/uL


(1.4-6.5) 


 


 


Lymphocytes # (Auto)


  2.37 K/uL


(1.2-3.4) 


 


 


Monocytes # (Auto)


  0.74 K/uL


(0.11-0.59) 


 


 


Eosinophils # (Auto)


  0.16 K/uL


(0-0.5) 


 


 


Basophils # (Auto)


  0.05 K/uL


(0-0.2) 


 


 


RDW Standard Deviation


  43.7 fL


(36.4-46.3) 


 


 


RDW Coefficient of Variation


  13.8 %


(11.5-14.5) 


 


 


Immature Granulocyte % (Auto) 0.6 %  


 


Immature Granulocyte # (Auto)


  0.05 K/uL


(0.00-0.02) 


 


 


Prothrombin Time


  33.1 SECONDS


(9.0-12.0) 


 


 


Prothromb Time International


Ratio 3.2 (0.9-1.1) 


  


 


 


Activated Partial


Thromboplast Time 35.1 SECONDS


(21.0-31.0) 


 


 


Partial Thromboplastin Ratio 1.4  


 


Est Creatinine Clear Calc


Drug Dose 55.4 ml/min 


  


 


 


Estimated GFR (


American) 50.8 


  


 


 


Estimated GFR (Non-


American 43.8 


  


 


 


BUN/Creatinine Ratio 17.3 (10-20)  


 


Calcium Level


  8.6 mg/dl


(8.5-10.1) 


 


 


Total Bilirubin


  0.5 mg/dl


(0.2-1) 


 


 


Direct Bilirubin


  0.2 mg/dl


(0-0.2) 


 


 


Aspartate Amino Transf


(AST/SGOT) 20 U/L (15-37) 


  


 


 


Alanine Aminotransferase


(ALT/SGPT) 19 U/L (12-78) 


  


 


 


Alkaline Phosphatase


  84 U/L


() 


 


 


Troponin I


  < 0.015 ng/ml


(0-0.045) 


 


 


Total Protein


  7.0 gm/dl


(6.4-8.2) 


 


 


Albumin


  2.8 gm/dl


(3.4-5.0) 


 


 


Bedside Hemoglobin


  


  8.5 g/dl


(14.0-18.0)


 


Bedside Hematocrit  25 % (42-52) 


 


Bedside Sodium


  


  142 mEq/L


(135-144)


 


Bedside Potassium


  


  4.0 mEq/L


(3.3-5.0)


 


Bedside Chloride


  


  108 mEq/L


(101-112)


 


Bedside Total CO2


  


  22 mEq/l


(24-31)


 


Anion Gap


  


  16.0 mmol/L


(16-25)


 


Bedside Blood Urea Nitrogen


  


  24 mg/dl


(7-18)


 


Bedside Creatinine


  


  1.5 mg/dl


(0.6-1.3)


 


Bedside Glucose (other)


  


  172 mg/dl


(70-99)


 


Bedside Ionized Calcium (Carly)


  


  1.17 mmol/l


(1.12-1.32)





Laboratory results as reviewed by me.





Medications Administered











 Medications


  (Trade)  Dose


 Ordered  Sig/Venu


 Route  Start Time


 Stop Time Status Last Admin


Dose Admin


 


 Phytonadione 10


 mg/Sodium Chloride  51 ml @ 


 102 mls/hr  ONE  ONCE


 IV  7/27/18 19:30


 7/27/18 19:59 DC 7/27/18 20:11


102 MLS/HR











ECG Per My Interpretation


Indication:  weakness


Rate (beats per minute):  84


Rhythm:  normal sinus


Findings:  other (No PVCs. Slight ST depression in V4 to V6)


Comparison ECG Date:  June 2016


Change:  no significant change





ED Course


1806: The patient was evaluated in room A3. A complete history and physical 

exam was performed.





1919: I checked on the patient and updated him with test results. I spoke with 

Dr. Crooks [facility] Hospitalist. He will reevaluate the patient for 

admission. He agrees with Vitamin K 10 mg.





1930: Ordered Phytonadione 10 mg/sodium Chloride 51 ml @ 102 mls/hr Protocol IV





Medical Decision


This is a 73-year-old male who presents with black stools and near syncope.  

Differential diagnosis includes GI bleed, peptic ulcer disease, anemia, 

supratherapeutic INR, cardiac.  I did perform a limited focused review of 

portions of the patient's old chart on the electronic medical record. The 

patient has a visit on July 23rd: INR 2.8, Hemoglobin 13, Creat 1.6





I did evaluate the patient as noted above.  Patient is on lifelong 

anticoagulation with Coumadin.  He is presenting with black stools at home.  He 

did state he felt near syncopal 2 days ago but that has since resolved.  He 

denies any chest pain or shortness of breath.  IV access was established.  The 

patient was placed on a continuous cardiac monitor.  I did order and personally 

review the patient's 12-lead EKG as described above.  I did order and review 

the patient's blood work as noted in the electronic medical record.  Hemoglobin 

is 9.2.  INR is 2.2.  I did discuss the test results with the patient.  I did 

recommend hospitalization for serial H&H's and further evaluation.  I did 

discuss case with the  and Dr. Monroy who agreed with my plan 

for treating him with vitamin K.  He was given vitamin K 10 mg IV.





Medication Reconcilliation


Current Medication List:  was personally reviewed by me





Blood Pressure Screening


Patient's blood pressure:  Elevated blood pressure


Blood pressure disposition:  Referred to PCP





Consults


Time Called:  1914


Consulting Physician:  Dr. Crooks [facility] Hospitalist


Returned Call:  1919


 I spoke with Dr. Valeriy Hernandez [facility] Hospitalist. He will reevaluate the 

patient for admission. He agrees with Vitamin K 10 mg.





Impression





 Primary Impression:  


 GI bleed


 Additional Impressions:  


 Anemia


 Supratherapeutic INR


 Cellulitis of right foot





Scribe Attestation


The scribe's documentation has been prepared under my direct and personally 

reviewed by me in its entirety. I confirm that the note above accurately 

reflects all work, treatment, procedures, and medical decision making performed 

by me.





Departure Information


Dispostion


Being Evaluated By Hospitalist





Referrals


Fabiano Jesus M.D. (PCP)





Patient Instructions


My [facility]





Problem Qualifiers








 Primary Impression:  


 GI bleed


 GI bleed type/associated pathology:  unspecified gastrointestinal hemorrhage 

type  Qualified Codes:  K92.2 - Gastrointestinal hemorrhage, unspecified


 Additional Impressions:  


 Anemia


 Anemia type:  other cause  Other causes of anemia:  other cause, not 

classified  Qualified Codes:  D64.89 - Other specified anemias

## 2018-07-28 VITALS
HEART RATE: 74 BPM | OXYGEN SATURATION: 97 % | SYSTOLIC BLOOD PRESSURE: 159 MMHG | TEMPERATURE: 98.06 F | DIASTOLIC BLOOD PRESSURE: 83 MMHG

## 2018-07-28 VITALS
DIASTOLIC BLOOD PRESSURE: 89 MMHG | OXYGEN SATURATION: 94 % | HEART RATE: 82 BPM | SYSTOLIC BLOOD PRESSURE: 193 MMHG | TEMPERATURE: 99.5 F

## 2018-07-28 VITALS
SYSTOLIC BLOOD PRESSURE: 165 MMHG | DIASTOLIC BLOOD PRESSURE: 79 MMHG | OXYGEN SATURATION: 96 % | HEART RATE: 80 BPM | TEMPERATURE: 97.34 F

## 2018-07-28 VITALS — DIASTOLIC BLOOD PRESSURE: 82 MMHG | SYSTOLIC BLOOD PRESSURE: 177 MMHG | HEART RATE: 90 BPM

## 2018-07-28 VITALS — DIASTOLIC BLOOD PRESSURE: 101 MMHG | SYSTOLIC BLOOD PRESSURE: 188 MMHG | OXYGEN SATURATION: 79 %

## 2018-07-28 VITALS — SYSTOLIC BLOOD PRESSURE: 153 MMHG | HEART RATE: 77 BPM | DIASTOLIC BLOOD PRESSURE: 78 MMHG

## 2018-07-28 VITALS
HEART RATE: 92 BPM | DIASTOLIC BLOOD PRESSURE: 80 MMHG | OXYGEN SATURATION: 95 % | TEMPERATURE: 98.6 F | SYSTOLIC BLOOD PRESSURE: 198 MMHG

## 2018-07-28 VITALS — HEART RATE: 90 BPM | DIASTOLIC BLOOD PRESSURE: 101 MMHG | SYSTOLIC BLOOD PRESSURE: 211 MMHG

## 2018-07-28 VITALS — HEART RATE: 75 BPM | DIASTOLIC BLOOD PRESSURE: 72 MMHG | SYSTOLIC BLOOD PRESSURE: 158 MMHG

## 2018-07-28 VITALS — OXYGEN SATURATION: 98 %

## 2018-07-28 VITALS — OXYGEN SATURATION: 95 %

## 2018-07-28 LAB
BASOPHILS # BLD: 0.06 K/UL (ref 0–0.2)
BASOPHILS NFR BLD: 0.7 %
BUN SERPL-MCNC: 22 MG/DL (ref 7–18)
CALCIUM SERPL-MCNC: 8.1 MG/DL (ref 8.5–10.1)
CO2 SERPL-SCNC: 23 MMOL/L (ref 21–32)
CREAT SERPL-MCNC: 1.42 MG/DL (ref 0.6–1.4)
EOS ABS #: 0.15 K/UL (ref 0–0.5)
EOSINOPHIL NFR BLD AUTO: 253 K/UL (ref 130–400)
GLUCOSE SERPL-MCNC: 166 MG/DL (ref 70–99)
HCT VFR BLD CALC: 26.9 % (ref 42–52)
HCT VFR BLD CALC: 27.6 % (ref 42–52)
HCT VFR BLD CALC: 28.6 % (ref 42–52)
HGB BLD-MCNC: 10 G/DL (ref 14–18)
HGB BLD-MCNC: 9.1 G/DL (ref 14–18)
HGB BLD-MCNC: 9.4 G/DL (ref 14–18)
IG#: 0.05 K/UL (ref 0–0.02)
IMM GRANULOCYTES NFR BLD AUTO: 32.7 %
INR PPP: 1.7 (ref 0.9–1.1)
LYMPHOCYTES # BLD: 2.71 K/UL (ref 1.2–3.4)
MCH RBC QN AUTO: 29.1 PG (ref 25–34)
MCHC RBC AUTO-ENTMCNC: 33.8 G/DL (ref 32–36)
MCV RBC AUTO: 85.9 FL (ref 80–100)
MONO ABS #: 0.84 K/UL (ref 0.11–0.59)
MONOCYTES NFR BLD: 10.1 %
NEUT ABS #: 4.47 K/UL (ref 1.4–6.5)
NEUTROPHILS # BLD AUTO: 1.8 %
NEUTROPHILS NFR BLD AUTO: 54.1 %
PMV BLD AUTO: 9.3 FL (ref 7.4–10.4)
POTASSIUM SERPL-SCNC: 3.6 MMOL/L (ref 3.5–5.1)
RED CELL DISTRIBUTION WIDTH CV: 13.6 % (ref 11.5–14.5)
RED CELL DISTRIBUTION WIDTH SD: 42.7 FL (ref 36.4–46.3)
SODIUM SERPL-SCNC: 142 MMOL/L (ref 136–145)
WBC # BLD AUTO: 8.28 K/UL (ref 4.8–10.8)

## 2018-07-28 RX ADMIN — SIMETHICONE SCH MG: 80 TABLET, CHEWABLE ORAL at 04:03

## 2018-07-28 RX ADMIN — PANTOPRAZOLE SODIUM SCH MLS/HR: 40 INJECTION, POWDER, FOR SOLUTION INTRAVENOUS at 09:41

## 2018-07-28 RX ADMIN — LOSARTAN POTASSIUM SCH MG: 50 TABLET, FILM COATED ORAL at 07:53

## 2018-07-28 RX ADMIN — SIMETHICONE SCH MG: 80 TABLET, CHEWABLE ORAL at 16:00

## 2018-07-28 RX ADMIN — PIPERACILLIN AND TAZOBACTAM SCH MLS/HR: 3; .375 INJECTION, POWDER, LYOPHILIZED, FOR SOLUTION INTRAVENOUS; PARENTERAL at 17:19

## 2018-07-28 RX ADMIN — SIMETHICONE SCH MG: 80 TABLET, CHEWABLE ORAL at 10:00

## 2018-07-28 RX ADMIN — INSULIN ASPART SCH UNITS: 100 INJECTION, SOLUTION INTRAVENOUS; SUBCUTANEOUS at 17:27

## 2018-07-28 RX ADMIN — SOTALOL HYDROCHLORIDE SCH MG: 80 TABLET ORAL at 07:52

## 2018-07-28 RX ADMIN — INSULIN ASPART SCH UNITS: 100 INJECTION, SOLUTION INTRAVENOUS; SUBCUTANEOUS at 20:56

## 2018-07-28 RX ADMIN — PIPERACILLIN AND TAZOBACTAM SCH MLS/HR: 3; .375 INJECTION, POWDER, LYOPHILIZED, FOR SOLUTION INTRAVENOUS; PARENTERAL at 20:59

## 2018-07-28 RX ADMIN — ATORVASTATIN CALCIUM SCH MG: 20 TABLET, FILM COATED ORAL at 20:54

## 2018-07-28 RX ADMIN — METOPROLOL TARTRATE PRN MG: 5 INJECTION, SOLUTION INTRAVENOUS at 05:36

## 2018-07-28 RX ADMIN — PIPERACILLIN AND TAZOBACTAM SCH MLS/HR: 3; .375 INJECTION, POWDER, LYOPHILIZED, FOR SOLUTION INTRAVENOUS; PARENTERAL at 05:43

## 2018-07-28 RX ADMIN — PANTOPRAZOLE SODIUM SCH MLS/HR: 40 INJECTION, POWDER, FOR SOLUTION INTRAVENOUS at 20:53

## 2018-07-28 RX ADMIN — SIMETHICONE SCH MG: 80 TABLET, CHEWABLE ORAL at 21:06

## 2018-07-28 RX ADMIN — SOTALOL HYDROCHLORIDE SCH MG: 80 TABLET ORAL at 20:55

## 2018-07-28 RX ADMIN — PANTOPRAZOLE SODIUM SCH MLS/HR: 40 INJECTION, POWDER, FOR SOLUTION INTRAVENOUS at 14:56

## 2018-07-28 RX ADMIN — PANTOPRAZOLE SODIUM SCH MLS/HR: 40 INJECTION, POWDER, FOR SOLUTION INTRAVENOUS at 04:02

## 2018-07-28 RX ADMIN — INSULIN GLARGINE SCH UNITS: 100 INJECTION, SOLUTION SUBCUTANEOUS at 20:57

## 2018-07-28 RX ADMIN — INSULIN ASPART SCH UNITS: 100 INJECTION, SOLUTION INTRAVENOUS; SUBCUTANEOUS at 13:29

## 2018-07-28 RX ADMIN — ASCORBIC ACID, THIAMINE MONONITRATE,RIBOFLAVIN, NIACINAMIDE, PYRIDOXINE HYDROCHLORIDE, FOLIC ACID, CYANOCOBALAMIN, BIOTIN, CALCIUM PANTOTHENATE, SCH CAP: 100; 1.5; 1.7; 20; 10; 1; 6000; 150000; 5 CAPSULE, LIQUID FILLED ORAL at 07:53

## 2018-07-28 RX ADMIN — BUMETANIDE SCH MG: 1 TABLET ORAL at 07:52

## 2018-07-28 RX ADMIN — CARBIDOPA AND LEVODOPA SCH TAB: 10; 100 TABLET ORAL at 09:42

## 2018-07-28 RX ADMIN — INSULIN ASPART SCH UNITS: 100 INJECTION, SOLUTION INTRAVENOUS; SUBCUTANEOUS at 09:45

## 2018-07-28 RX ADMIN — AMLODIPINE BESYLATE SCH MG: 5 TABLET ORAL at 20:54

## 2018-07-28 NOTE — FAMILY MEDICINE PROGRESS NOTE
Progress Note


Date of Service


Jul 28, 2018.





Subjective


Pt evaluation today including:  conversation w/ patient, physical exam, chart 

review, lab review, review of inpatient medication list


Pain:  0/10


PO Intake:  Tolerating


Voiding:  no voiding problems


Patient is a 73 year old male with a past medical history of Diabetes, 

Hypertension, and Protein C Deficiency with 2 previous DVTs in the left leg 

that presents with dark stools x 3 days. The patient states that he first 

noticed his dark stools on Tuesday morning and they have continued to be dark 

throughout the week. The patient states that they have appeared black with 

blood surrounding them in the toilet. He also appreciates his bowel movements 

have been looser than normal. He is normally constipated and had a hard bowel 

movement Monday evening where he had to strain but has not had significant 

bleeding with wiping since. The patient is on Coumadin for a history of 

multiple DVTs and Protein C Deficiency. His last INR this week was around 2 (

patient states does not recall exact number).





They patient has also been on antibiotics this week for a left lower extremity 

cellulitis. His symptoms were initially believed to be secondary to Doxycycline 

use so they switched his antibiotics to Clindamycin. The patient states that 

there was erythema over the dorsum of his foot along with pain when moving his 

foot initially. The pain improved with the Clindamycin but her continues to 

have some residual erythema and swelling of the left lower extremity.





He appreciates his blood pressures over the last 2 weeks have ranged from 170-

220 systolic, and called his PCP who told him to "try to make it better" at 

home.





The patient denies an recent fevers, chills, sweats, abdominal pain, diarrhea, 

vomiting, nausea, or any other acute complaints. He states that he has been 

fatigued but this has been chronic over the last year.





Patient resting comfortably during examination today, no complaints overnight, 

toileting appropriate, npo this a.m. now tolerating diet, sleeping well.





   Constitutional:  No fever, No chills, No sweats, No weight loss


   Eyes:  No worsening of vision


   ENT:  No hearing loss, No nasal symptoms


   Respiratory:  No cough, No sputum, No wheezing, No shortness of breath


   Cardiovascular:  + palpitations (occasional)


   Abdomen:  + GI bleeding (reported dark stools and BRBPR)


   Musculoskeletal:  No joint pain


   Male :  No urinary frequency


   Heme:  No abnormal bleeding/bruising


   Endo:  No fatigue


   Skin:  No rash, No itch, No new/changing skin lesions


   All Other Systems:  Reviewed and Negative





Medications





Current Inpatient Medications








 Medications


  (Trade)  Dose


 Ordered  Sig/Venu


 Route  Start Time


 Stop Time Status Last Admin


Dose Admin


 


 Ondansetron HCl


  (Zofran Inj)  4 mg  Q6H  PRN


 IV  7/27/18 20:15


 8/26/18 20:14   


 


 


 Insulin Aspart


  (novoLOG ASPART)  **SLIDING


 SCALE**


 **If C...  ACHS


 SC  7/27/18 21:00


 8/26/18 20:59  7/27/18 23:43


1 UNITS


 


 Glucose


  (Glucose 40% Gel)  15-30


 GRAMS 15


 GRAMS...  UD  PRN


 PO  7/27/18 20:15


 8/26/18 20:14   


 


 


 Glucose


  (Glucose Chew


 Tab)  4-8


 Tablets 4


 Tabl...  UD  PRN


 PO  7/27/18 20:15


 8/26/18 20:14   


 


 


 Dextrose


  (Dextrose 50%


 50ML Syringe)  25-50ML


 25ML FOR


 ...  UD  PRN


 IV  7/27/18 20:15


 8/26/18 20:14   


 


 


 Glucagon


  (Glucagon Inj)  1 mg  UD  PRN


 SQ  7/27/18 20:15


 8/26/18 20:14   


 


 


 Carbohydrates


  (Carbohydrates


 For Hypoglycemia)  15-30 GRAMS


 15 grams if


 BSG 54-69...  UD  PRN


 PO  7/27/18 20:15


 8/26/18 20:14   


 


 


 Hydralazine HCl


  (HydrALAZINE INJ)  10 mg  Q4H  PRN


 IV.  7/27/18 20:15


 8/26/18 20:14  7/28/18 05:02


10 MG


 


 Metoprolol


 Tartrate


  (Lopressor Iv)  5 mg  Q4  PRN


 IV  7/27/18 20:15


 8/26/18 20:14  7/28/18 05:36


5 MG


 


 Simethicone


  (Mylicon Chew


 Tab)  80 mg  Q6H


 PO  7/27/18 22:00


 8/26/18 21:59  7/28/18 04:03


80 MG


 


 Miscellaneous


 Information


  (Consult)  1 ea  UD  PRN


 N/A  7/27/18 21:00


 8/26/18 20:59   


 


 


 Amlodipine


 Besylate


  (Norvasc Tab)  10 mg  QPM


 PO  7/28/18 21:00


 8/27/18 20:59   


 


 


 Atorvastatin


 Calcium


  (Lipitor Tab)  20 mg  HS


 PO  7/28/18 21:00


 8/27/18 20:59   


 


 


 Bumetanide


  (Bumex Tab)  2 mg  DAILY


 PO  7/28/18 09:00


 8/27/18 08:59   


 


 


 Carbidopa/Levodopa


  (Sinemet 10/


 100MG Tab)  1 tab  DAILY


 PO  7/28/18 09:00


 8/27/18 08:59   


 


 


 Fluticasone


 Propionate


  (Flonase Nasal


 Spray)  2 sprays  DAILY  PRN


 ISABEL  7/27/18 21:45


 8/26/18 21:44   


 


 


 Insulin Aspart


  (novoLOG ASPART)  30 units  QDB


 SQ  7/28/18 07:30


 8/27/18 07:29   


 


 


 Insulin Aspart


  (novoLOG ASPART)  35 units  BID@1130,1645


 SQ  7/28/18 11:30


 8/27/18 11:29 UNV  


 


 


 Losartan Potassium


  (coZAAR TAB)  100 mg  DAILY


 PO  7/28/18 09:00


 8/27/18 08:59   


 


 


 Terazosin HCl


  (Hytrin Cap)  10 mg  HS


 PO  7/28/18 21:00


 8/27/18 20:59   


 


 


 Vitamin B Complex/


 Vit C/Folic Acid


  (Nephrocaps)  1 cap  DAILY


 PO  7/28/18 09:00


 8/27/18 08:59   


 


 


 Insulin Glargine


  (Lantus Solostar


 Pen)  65 units  HS


 SC  7/28/18 21:00


 8/27/18 20:59   


 


 


 Sotalol HCl


  (Betapace Tab)  120 mg  BID


 PO  7/28/18 09:00


 8/27/18 08:59   


 


 


 Pantoprazole


 Sodium 40 mg/


 Dextrose  100 ml @ 


 20 mls/hr  Q5H


 IV  7/27/18 23:00


 8/26/18 22:59  7/28/18 04:02


20 MLS/HR


 


 Piperacillin Sod/


 Tazobactam Sod


 3.375 gm/Dextrose  115 ml @ 


 28.75 mls/


 hr  Q8


 IV  7/28/18 06:00


 8/7/18 05:59  7/28/18 05:43


28.75 MLS/HR











Objective


Vital Signs











  Date Time  Temp Pulse Resp B/P (MAP) Pulse Ox O2 Delivery O2 Flow Rate FiO2


 


7/28/18 07:01 37.0 92 19 198/80 (119) 95   


 


7/28/18 06:00  90 16 177/82 (113)    


 


7/28/18 05:36    201/108    


 


7/28/18 04:39 37.5 82 23 193/89 (123) 94 Room Air  


 


7/28/18 02:21  75  158/72 (100)    


 


7/28/18 01:39    188/105    


 


7/28/18 01:20     98 Room Air  


 


7/28/18 00:46  90 18 211/101 (137)    


 


7/28/18 00:15    188/101 (130)    


 


7/27/18 23:14  84  215/108    


 


7/27/18 22:30 37.0 93 20 231/117 98 Room Air  





    190/97    


 


7/27/18 21:50  84 18 234/91 97   


 


7/27/18 21:00  83 18 208/120 98 Room Air  


 


7/27/18 19:30  83 20 188/105 97 Room Air  


 


7/27/18 19:26  84      


 


7/27/18 17:56 36.9 88 18 216/89 98 Room Air  











Physical Exam


General Appearance:  WD/WN, no apparent distress


Eyes:  normal inspection, EOMI, sclerae normal


ENT:  hearing grossly normal


Neck:  supple, no adenopathy, thyroid normal, no JVD, no carotid bruits, 

trachea midline


Respiratory/Chest:  chest non-tender, lungs clear, normal breath sounds, no 

respiratory distress, no accessory muscle use


Cardiovascular:  regular rate, rhythm, no edema, no gallop, no JVD, no murmur, 

+ pertinent finding (reports palpitations)


Abdomen:  normal bowel sounds, non tender, soft, no organomegaly, no pulsatile 

mass, + pertinent finding (reports BRBPR)


Extremities:  non-tender, normal inspection, no calf tenderness, normal 

capillary refill, + pedal edema (1+)


Neurologic/Psychiatric:  alert, normal mood/affect


Skin:  normal color, warm/dry, + rash (Improving )


Lymphatic:  no adenopathy





Laboratory Results


Last Resulted


7/28/18 02:12








Red Blood Count 3.13, Mean Corpuscular Volume 85.9, Mean Corpuscular Hemoglobin 

29.1, Mean Corpuscular Hemoglobin Concent 33.8, Mean Platelet Volume 9.3, 

Neutrophils (%) (Auto) 54.1, Lymphocytes (%) (Auto) 32.7, Monocytes (%) (Auto) 

10.1, Eosinophils (%) (Auto) 1.8, Basophils (%) (Auto) 0.7, Neutrophils # (Auto

) 4.47, Lymphocytes # (Auto) 2.71, Monocytes # (Auto) 0.84, Eosinophils # (Auto

) 0.15, Basophils # (Auto) 0.06





Last Resulted


7/28/18 02:12











Past 24 Hours








Test


  7/27/18


18:26 7/28/18


02:12 Range/Units


 


 


Prothromb Time International


Ratio 3.2 H


  1.7 H


  0.9-1.1  


 


 


Prothrombin Time


  33.1 H


  17.8 H


  9.0-12.0


SECONDS


 


Troponin I < 0.015   0-0.045  ng/ml











Assessment and Plan


Patient is a 73 year old male with a past medical history of Diabetes, 

Hypertension, and Protein C Deficiency with 2 previous DVTs in the left leg 

that presents with dark stools x 3 days





GI Bleeding


- Hemoccult Positive


- Hold Coumadin and Aspirin


- INR 3.2 --> 10mg Vitamin K --> 7/28 INR 1.7


- Repeat H/H q4h --> Current Hgb 10.0


- on clear liquids


- Protonix 40 mg q5h 


- GI Consult 


   -"GI bleeding---not entirely clear if upper with rapid transit or lower 

bleeding. 


   -Continue PPI. 


   -Seems to have slowed or stopped so unless starts to rapidly bleed, will 

plan EGD and colonoscopy on monday.  


   -Clear liquid diet over the weekend in case urgent scope needed and to prep 

bowel. 


   -Acute blood loss anemia--no baseline in the chart, stable---follow H and H 

and transfuse prn. 


   -coumadin coagulopathy--continue to hold until results of scopes known. 


   -change in bms with constipation--colo monday


   -fhx CRC--colonoscopy monday."





Lower Extremity Cellulitis


- Zosyn IV


- Holding previous home PO Clindamycin





Protein C Deficiency with History of DVT


- Hold Coumadin


- Left lower extremity doppler US


   -No evidence of deep venous thrombus within the right lower extremity.





Diabetes


- Hold home Metformin 


- Glargine 65 units qhs 


- Novolog SSI with BSG Checks AC/HS CF:5 CHO Ratio: 1:2 





Hypertension


- Holding home PO Antihypertensives 


- PRN Lopressor and Hydralazine ordered for systolic greater than 180 





Palpitations


-likely ectopy drinks 1/2 gallon of Iced tea per day


-Remain on tele


-Prn EKG for palpitations





HLD


- Resume Lipitor





CKD Stage 3


- Cr of 1.5


- Resume Bumex





DVT


- SCDs





BPH


- Resume Terazosin 





Code Status 


- Full Resuscitation





Resident Physician Supervision Note:





I interviewed and examined the patient. Discussed with Dr. Colin and agree 

with findings and plan as documented in the note. Any exceptions or 

clarifications are listed here: None





Documented By:  Peterson Daniel


no further bleeding. d/w GI - for scopes


no new complaints


vitals noted nad breathing unlabored no pallor or icterus





GI bleeding- hold coumadin, follow vitals and Hgb, supportive care, for scope.  





otherwise as above


Continued [facility] stay due to:  multiple IV medications needed


Discharge planning:  home





Resident Tracking


Resident Involvement:  Resident Care Provided


Care Provided:  Adult Hospital Medicine

## 2018-07-28 NOTE — GASTROINTESTINAL CONSULTATION
Gastrointestinal Consultation


Date of Consultation:  Jul 28, 2018


Attending Physician:  Dr Peterson Daniel


Consulting Physician:  DR Yamil Loza


Reason for Consultation:  GI bleeding


History of Present Illness


CC black stools. HPI No scopes in this EMR and no GI data in [facility] EMR. Pt states 

never had EGD but had colonoscopy about 2015 by DR Letty mcleod per patient 

report. Pt states he had some bleeding issue similar to current event shortly 

after colonoscopy but no additional testing done. Pt states over the last year 

he has had change in bms with constipation. Over last 3 days he has had more 

constipation but also black with red stool as well. He has felt cold this 

summer but no baltazar fever or chills. Recently some lightheadedness. He states 

he has gained weight about 20 lbs in last year.  He has been on abx for lower 

extremity cellulitis.  He has had DVTs times 2 and has Protein C deficiency so 

on chronic coumadin. He is also on ASA. He denies abd pain. Hgb in ER 9.2 and 

stable. INR in ER 3.2 and is 1.7 today after some correction.  He states had a 

stool between 10 pm and MN but none since.  Rectal in ER reported maroon stool 

heme positive.





Past Medical/Surgical History


Medical Problems:


(1) Anemia


Status: Acute  





(2) Cellulitis of right foot


Status: Acute  





(3) Supratherapeutic INR


Status: Acute  











Family History





Cancer





Social History


Smoking Status:  Never Smoker





Allergies


Coded Allergies:  


     Penicillins (Verified  Allergy, Unknown, RASH FROM INJECTED FORM ONLY, po 

OK, 12/14/15)


 PT CAN TOLERATE ALL ORAL PCN MEDS


     Pravastatin (Unverified  Adverse Reaction, Unknown, LEGS GET WEAK, 12/14/15

)





Current Medications





Home Meds and Scripts








 Medications  Dose


 Route/Sig


 Max Daily Dose Days Date Category Dose


Instructions


 


 Jantoven


  (Warfarin Sodium)


 5 Mg Tab  2.5 Mg


 PO 4XWK


    7/27/18 Reported  TAKE SAT, SUN, TUES, THUR


 


 Jantoven


  (Warfarin Sodium)


 5 Mg Tab  5 Mg


 PO MWF


    7/27/18 Reported 


 


 Tylenol


  (Acetaminophen)


 500 Mg Tab  1-2 Tabs


 PO DAILY


    7/27/18 Reported 


 


 Toujeo Solostar


  (Insulin


 Glargine) 300


 Unit/Ml Inj  65-75 Units


 SQ HS


    7/27/18 Reported 


 


 Hytrin (Terazosin


 HCl) 10 Mg Cap  10 Mg


 PO HS


    7/27/18 Reported 


 


 Sotalol Hcl 120


 Mg Tab  120 Mg


 PO BID


    7/27/18 Reported 


 


 Novolog Flexpen


  (Insulin Aspart)


 100 Units/Ml Inj  35 Units


 SQ BIDM


    7/27/18 Reported  TAKE LUNCH & SUPPER PLUS SLIDING SCALE.


 


 Novolog Flexpen


  (Insulin Aspart)


 100 Units/Ml Inj  30 Units


 SQ QAM


    7/27/18 Reported  PLUS SLIDING SCALE. TAKE WITH BREAKFAST


 


 Cozaar (Losartan


 Potassium) 100 Mg


 Tab  100 Mg


 PO DAILY


    7/27/18 Reported 


 


 Norco 5MG/325MG


  (Acetaminophen/Hydrocodone


 Bitart)  Tab  1-2 Tabs


 PO QID PRN


    7/27/18 Reported  PRN PAIN


 


 Ginkgo Biloba 120


 Mg Tab  120 Mg


 PO DAILY


    7/27/18 Reported 


 


 Coq10 (Coenzyme


 Q10


  (Ubidecarenone))


 200 Mg Cap  200 Mg


 QAM


    7/27/18 Reported 


 


 Clindamycin HCl


 300 Mg Cap  300 Mg


 PO QID


   10 7/27/18 Reported  START 7/26/18


 


 Bumetanide 1 Mg


 Tab  2 Mg


 PO DAILY


    7/27/18 Reported 


 


 Nephrocaps


  (Vitamin B


 Complex/Vit


 C/Folic Acid)  Cap  1 Cap


 PO DAILY


    7/27/18 Reported 


 


 Lipitor


  (Atorvastatin


 Calcium) 20 Mg Tab  20 Mg


 PO HS


    7/27/18 Reported 


 


 Aspirin Ec


  (Aspirin) 81 Mg


 Tab  81 Mg


 PO HOLD


    7/27/18 Reported 


 


 Carbidopa/Levodopa


  (Carbidopa-Levodopa)


 1 Tab Tab  1 Tab


 PO DAILY


    7/27/18 Reported 


 


 Flonase Allergy


 Relief


  (Fluticasone


 Propionate


  (Nasal)) 50


 Mcg/Act Spr  2 Sprays


 ISABEL DAILY PRN


    7/27/18 Reported 


 


 [Fluocinonide


 Sol]  1 Dose


 TOP PRN


    12/1/15 Reported  0.05% SOL


 


 Multi Vitamin


 Daily (Multiple


 Vitamin) 1 Tab Tab  1 Tab


 PO QAM


    4/28/15 Reported 


 


 Metformin HCl 500


 Mg Tab  500 Mg


 PO BID


    4/28/15 Reported 


 


 Norvasc


  (Amlodipine


 Besylate) 10 Mg


 Tab  10 Mg


 PO QPM


    4/28/15 Reported 











Review of Systems


see HPI, otherwise 10 ROS negative





Physical Exam











  Date Time  Temp Pulse Resp B/P (MAP) Pulse Ox O2 Delivery O2 Flow Rate FiO2


 


7/28/18 11:44  77  153/78 (103)    


 


7/28/18 07:01 37.0 92 19 198/80 (119) 95   


 


7/28/18 06:00  90 16 177/82 (113)    


 


7/28/18 05:36    201/108    


 


7/28/18 04:39 37.5 82 23 193/89 (123) 94 Room Air  


 


7/28/18 02:21  75  158/72 (100)    


 


7/28/18 01:39    188/105    


 


7/28/18 01:20     98 Room Air  


 


7/28/18 00:46  90 18 211/101 (137)    


 


7/28/18 00:15    188/101 (130)    


 


7/27/18 23:14  84  215/108    


 


7/27/18 22:30 37.0 93 20 231/117 98 Room Air  





    190/97    


 


7/27/18 21:50  84 18 234/91 97   


 


7/27/18 21:00  83 18 208/120 98 Room Air  


 


7/27/18 19:30  83 20 188/105 97 Room Air  


 


7/27/18 19:26  84      


 


7/27/18 17:56 36.9 88 18 216/89 98 Room Air  








General Appearance:  WD/WN, no apparent distress


Eyes:  normal inspection, PERRL


ENT:  hearing grossly normal, pharynx normal


Neck:  supple, trachea midline


Respiratory/Chest:  lungs clear, no respiratory distress


Cardiovascular:  no edema, no murmur


Abdomen:  normal bowel sounds, non tender, soft, no organomegaly


Extremities:  non-tender


Neurologic/Psych:  CNs II-XII nml as tested, normal mood/affect


Skin:  normal color, warm/dry





Laboratory Results





Last 24 Hours








Test


  7/27/18


18:26 7/27/18


18:40 7/27/18


21:06 7/27/18


23:40


 


White Blood Count 7.94 K/uL    


 


Red Blood Count 3.20 M/uL    


 


Hemoglobin 9.2 g/dL   8.8 g/dL  


 


Hematocrit 27.7 %   25.5 %  


 


Mean Corpuscular Volume 86.6 fL    


 


Mean Corpuscular Hemoglobin 28.8 pg    


 


Mean Corpuscular Hemoglobin


Concent 33.2 g/dl 


  


  


  


 


 


Platelet Count 258 K/uL    


 


Mean Platelet Volume 9.9 fL    


 


Neutrophils (%) (Auto) 57.7 %    


 


Lymphocytes (%) (Auto) 29.8 %    


 


Monocytes (%) (Auto) 9.3 %    


 


Eosinophils (%) (Auto) 2.0 %    


 


Basophils (%) (Auto) 0.6 %    


 


Neutrophils # (Auto) 4.57 K/uL    


 


Lymphocytes # (Auto) 2.37 K/uL    


 


Monocytes # (Auto) 0.74 K/uL    


 


Eosinophils # (Auto) 0.16 K/uL    


 


Basophils # (Auto) 0.05 K/uL    


 


RDW Standard Deviation 43.7 fL    


 


RDW Coefficient of Variation 13.8 %    


 


Immature Granulocyte % (Auto) 0.6 %    


 


Immature Granulocyte # (Auto) 0.05 K/uL    


 


Prothrombin Time 33.1 SECONDS    


 


Prothromb Time International


Ratio 3.2 


  


  


  


 


 


Activated Partial


Thromboplast Time 35.1 SECONDS 


  


  


  


 


 


Partial Thromboplastin Ratio 1.4    


 


Sodium Level 141 mmol/L    


 


Potassium Level 4.0 mmol/L    


 


Chloride Level 110 mmol/L    


 


Carbon Dioxide Level 22 mmol/L    


 


Anion Gap 9.0 mmol/L  16.0 mmol/L   


 


Blood Urea Nitrogen 26 mg/dl    


 


Creatinine 1.53 mg/dl    


 


Est Creatinine Clear Calc


Drug Dose 55.4 ml/min 


  


  


  


 


 


Estimated GFR (


American) 50.8 


  


  


  


 


 


Estimated GFR (Non-


American 43.8 


  


  


  


 


 


BUN/Creatinine Ratio 17.3    


 


Random Glucose 166 mg/dl    


 


Calcium Level 8.6 mg/dl    


 


Total Bilirubin 0.5 mg/dl    


 


Direct Bilirubin 0.2 mg/dl    


 


Aspartate Amino Transf


(AST/SGOT) 20 U/L 


  


  


  


 


 


Alanine Aminotransferase


(ALT/SGPT) 19 U/L 


  


  


  


 


 


Alkaline Phosphatase 84 U/L    


 


Troponin I < 0.015 ng/ml    


 


Total Protein 7.0 gm/dl    


 


Albumin 2.8 gm/dl    


 


Bedside Hemoglobin  8.5 g/dl   


 


Bedside Hematocrit  25 %   


 


Bedside Sodium  142 mEq/L   


 


Bedside Potassium  4.0 mEq/L   


 


Bedside Chloride  108 mEq/L   


 


Bedside Total CO2  22 mEq/l   


 


Bedside Blood Urea Nitrogen  24 mg/dl   


 


Bedside Creatinine  1.5 mg/dl   


 


Bedside Glucose (other)  172 mg/dl   


 


Bedside Ionized Calcium (Carly)  1.17 mmol/l   


 


Bedside Glucose    187 mg/dl 


 


Test


  7/28/18


02:12 7/28/18


07:16 7/28/18


08:04 7/28/18


11:12


 


White Blood Count 8.28 K/uL    


 


Red Blood Count 3.13 M/uL    


 


Hemoglobin 9.1 g/dL   9.4 g/dL  


 


Hematocrit 26.9 %   27.6 %  


 


Mean Corpuscular Volume 85.9 fL    


 


Mean Corpuscular Hemoglobin 29.1 pg    


 


Mean Corpuscular Hemoglobin


Concent 33.8 g/dl 


  


  


  


 


 


Platelet Count 253 K/uL    


 


Mean Platelet Volume 9.3 fL    


 


Neutrophils (%) (Auto) 54.1 %    


 


Lymphocytes (%) (Auto) 32.7 %    


 


Monocytes (%) (Auto) 10.1 %    


 


Eosinophils (%) (Auto) 1.8 %    


 


Basophils (%) (Auto) 0.7 %    


 


Neutrophils # (Auto) 4.47 K/uL    


 


Lymphocytes # (Auto) 2.71 K/uL    


 


Monocytes # (Auto) 0.84 K/uL    


 


Eosinophils # (Auto) 0.15 K/uL    


 


Basophils # (Auto) 0.06 K/uL    


 


RDW Standard Deviation 42.7 fL    


 


RDW Coefficient of Variation 13.6 %    


 


Immature Granulocyte % (Auto) 0.6 %    


 


Immature Granulocyte # (Auto) 0.05 K/uL    


 


Prothrombin Time 17.8 SECONDS    


 


Prothromb Time International


Ratio 1.7 


  


  


  


 


 


Sodium Level 142 mmol/L    


 


Potassium Level 3.6 mmol/L    


 


Chloride Level 111 mmol/L    


 


Carbon Dioxide Level 23 mmol/L    


 


Anion Gap 8.0 mmol/L    


 


Blood Urea Nitrogen 22 mg/dl    


 


Creatinine 1.42 mg/dl    


 


Est Creatinine Clear Calc


Drug Dose 58.7 ml/min 


  


  


  


 


 


Estimated GFR (


American) 55.6 


  


  


  


 


 


Estimated GFR (Non-


American 48.0 


  


  


  


 


 


BUN/Creatinine Ratio 15.4    


 


Random Glucose 166 mg/dl    


 


Calcium Level 8.1 mg/dl    


 


Bedside Glucose  203 mg/dl   249 mg/dl 


 


Test


  7/28/18


12:00 


  


  


 











Impression


GI bleeding---not entirely clear if upper with rapid transit or lower bleeding. 

Continue PPI. Seems to have slowed or stopped so unless starts to rapidly bleed 

requiring weekend scopes, will plan EGD and colonoscopy on monday.  Clear 

liquid diet over the weekend in case urgent scope needed and to prep bowel. 








Acute blood loss anemia--no baseline in the chart, stable---follow H and H and 

transfuse prn. 





coumadin coagulopathy--continue to hold until results of scopes known. 





change in bms wiht constipation--colo monday





fhx CRC--colonoscopy monday.

## 2018-07-29 VITALS
DIASTOLIC BLOOD PRESSURE: 81 MMHG | SYSTOLIC BLOOD PRESSURE: 129 MMHG | TEMPERATURE: 97.7 F | OXYGEN SATURATION: 93 % | HEART RATE: 74 BPM

## 2018-07-29 VITALS
SYSTOLIC BLOOD PRESSURE: 146 MMHG | HEART RATE: 76 BPM | DIASTOLIC BLOOD PRESSURE: 84 MMHG | OXYGEN SATURATION: 97 % | TEMPERATURE: 98.42 F

## 2018-07-29 VITALS
HEART RATE: 62 BPM | SYSTOLIC BLOOD PRESSURE: 136 MMHG | TEMPERATURE: 98.24 F | DIASTOLIC BLOOD PRESSURE: 72 MMHG | OXYGEN SATURATION: 98 %

## 2018-07-29 VITALS
OXYGEN SATURATION: 96 % | SYSTOLIC BLOOD PRESSURE: 135 MMHG | DIASTOLIC BLOOD PRESSURE: 61 MMHG | TEMPERATURE: 97.7 F | HEART RATE: 74 BPM

## 2018-07-29 VITALS
DIASTOLIC BLOOD PRESSURE: 72 MMHG | OXYGEN SATURATION: 98 % | TEMPERATURE: 97.52 F | HEART RATE: 73 BPM | SYSTOLIC BLOOD PRESSURE: 169 MMHG

## 2018-07-29 VITALS
DIASTOLIC BLOOD PRESSURE: 77 MMHG | SYSTOLIC BLOOD PRESSURE: 160 MMHG | OXYGEN SATURATION: 95 % | HEART RATE: 80 BPM | TEMPERATURE: 98.06 F

## 2018-07-29 VITALS
SYSTOLIC BLOOD PRESSURE: 136 MMHG | DIASTOLIC BLOOD PRESSURE: 72 MMHG | TEMPERATURE: 98.24 F | OXYGEN SATURATION: 98 % | HEART RATE: 62 BPM

## 2018-07-29 VITALS
OXYGEN SATURATION: 95 % | DIASTOLIC BLOOD PRESSURE: 71 MMHG | SYSTOLIC BLOOD PRESSURE: 154 MMHG | HEART RATE: 76 BPM | TEMPERATURE: 98.06 F

## 2018-07-29 VITALS — SYSTOLIC BLOOD PRESSURE: 156 MMHG | DIASTOLIC BLOOD PRESSURE: 76 MMHG

## 2018-07-29 LAB
ALBUMIN SERPL-MCNC: 2.5 GM/DL (ref 3.4–5)
ALP SERPL-CCNC: 80 U/L (ref 45–117)
ALT SERPL-CCNC: 20 U/L (ref 12–78)
AST SERPL-CCNC: 16 U/L (ref 15–37)
BASOPHILS # BLD: 0.05 K/UL (ref 0–0.2)
BASOPHILS NFR BLD: 0.6 %
BUN SERPL-MCNC: 19 MG/DL (ref 7–18)
CALCIUM SERPL-MCNC: 8.3 MG/DL (ref 8.5–10.1)
CO2 SERPL-SCNC: 23 MMOL/L (ref 21–32)
CREAT SERPL-MCNC: 1.8 MG/DL (ref 0.6–1.4)
EOS ABS #: 0.26 K/UL (ref 0–0.5)
EOSINOPHIL NFR BLD AUTO: 266 K/UL (ref 130–400)
GLUCOSE SERPL-MCNC: 120 MG/DL (ref 70–99)
HCT VFR BLD CALC: 25.9 % (ref 42–52)
HGB BLD-MCNC: 8.8 G/DL (ref 14–18)
IG#: 0.02 K/UL (ref 0–0.02)
IMM GRANULOCYTES NFR BLD AUTO: 30.8 %
INR PPP: 1.1 (ref 0.9–1.1)
LYMPHOCYTES # BLD: 2.73 K/UL (ref 1.2–3.4)
MCH RBC QN AUTO: 29.2 PG (ref 25–34)
MCHC RBC AUTO-ENTMCNC: 34 G/DL (ref 32–36)
MCV RBC AUTO: 86 FL (ref 80–100)
MONO ABS #: 0.72 K/UL (ref 0.11–0.59)
MONOCYTES NFR BLD: 8.1 %
NEUT ABS #: 5.08 K/UL (ref 1.4–6.5)
NEUTROPHILS # BLD AUTO: 2.9 %
NEUTROPHILS NFR BLD AUTO: 57.4 %
PMV BLD AUTO: 9.5 FL (ref 7.4–10.4)
POTASSIUM SERPL-SCNC: 3.3 MMOL/L (ref 3.5–5.1)
PROT SERPL-MCNC: 6.2 GM/DL (ref 6.4–8.2)
RED CELL DISTRIBUTION WIDTH CV: 14 % (ref 11.5–14.5)
RED CELL DISTRIBUTION WIDTH SD: 43.7 FL (ref 36.4–46.3)
SODIUM SERPL-SCNC: 139 MMOL/L (ref 136–145)
WBC # BLD AUTO: 8.86 K/UL (ref 4.8–10.8)

## 2018-07-29 RX ADMIN — SIMETHICONE SCH MG: 80 TABLET, CHEWABLE ORAL at 10:00

## 2018-07-29 RX ADMIN — PANTOPRAZOLE SODIUM SCH MLS/HR: 40 INJECTION, POWDER, FOR SOLUTION INTRAVENOUS at 15:31

## 2018-07-29 RX ADMIN — PANTOPRAZOLE SODIUM SCH MLS/HR: 40 INJECTION, POWDER, FOR SOLUTION INTRAVENOUS at 02:41

## 2018-07-29 RX ADMIN — PANTOPRAZOLE SODIUM SCH MLS/HR: 40 INJECTION, POWDER, FOR SOLUTION INTRAVENOUS at 10:06

## 2018-07-29 RX ADMIN — CARBIDOPA AND LEVODOPA SCH TAB: 10; 100 TABLET ORAL at 10:07

## 2018-07-29 RX ADMIN — PIPERACILLIN AND TAZOBACTAM SCH MLS/HR: 3; .375 INJECTION, POWDER, LYOPHILIZED, FOR SOLUTION INTRAVENOUS; PARENTERAL at 21:58

## 2018-07-29 RX ADMIN — SODIUM CHLORIDE, SODIUM LACTATE, POTASSIUM CHLORIDE, AND CALCIUM CHLORIDE SCH MLS/HR: 600; 310; 30; 20 INJECTION, SOLUTION INTRAVENOUS at 10:12

## 2018-07-29 RX ADMIN — INSULIN GLARGINE SCH UNITS: 100 INJECTION, SOLUTION SUBCUTANEOUS at 20:18

## 2018-07-29 RX ADMIN — INSULIN ASPART SCH UNITS: 100 INJECTION, SOLUTION INTRAVENOUS; SUBCUTANEOUS at 20:17

## 2018-07-29 RX ADMIN — SIMETHICONE SCH MG: 80 TABLET, CHEWABLE ORAL at 15:32

## 2018-07-29 RX ADMIN — LOSARTAN POTASSIUM SCH MG: 50 TABLET, FILM COATED ORAL at 10:07

## 2018-07-29 RX ADMIN — INSULIN ASPART SCH UNITS: 100 INJECTION, SOLUTION INTRAVENOUS; SUBCUTANEOUS at 10:12

## 2018-07-29 RX ADMIN — SIMETHICONE SCH MG: 80 TABLET, CHEWABLE ORAL at 05:12

## 2018-07-29 RX ADMIN — POTASSIUM CHLORIDE SCH MEQ: 1500 TABLET, EXTENDED RELEASE ORAL at 10:06

## 2018-07-29 RX ADMIN — POTASSIUM CHLORIDE SCH MEQ: 1500 TABLET, EXTENDED RELEASE ORAL at 14:04

## 2018-07-29 RX ADMIN — SOTALOL HYDROCHLORIDE SCH MG: 80 TABLET ORAL at 20:13

## 2018-07-29 RX ADMIN — PIPERACILLIN AND TAZOBACTAM SCH MLS/HR: 3; .375 INJECTION, POWDER, LYOPHILIZED, FOR SOLUTION INTRAVENOUS; PARENTERAL at 05:13

## 2018-07-29 RX ADMIN — INSULIN ASPART SCH UNITS: 100 INJECTION, SOLUTION INTRAVENOUS; SUBCUTANEOUS at 23:47

## 2018-07-29 RX ADMIN — SIMETHICONE SCH MG: 80 TABLET, CHEWABLE ORAL at 21:58

## 2018-07-29 RX ADMIN — POTASSIUM CHLORIDE SCH MEQ: 1500 TABLET, EXTENDED RELEASE ORAL at 12:17

## 2018-07-29 RX ADMIN — AMLODIPINE BESYLATE SCH MG: 5 TABLET ORAL at 20:12

## 2018-07-29 RX ADMIN — INSULIN ASPART SCH UNITS: 100 INJECTION, SOLUTION INTRAVENOUS; SUBCUTANEOUS at 16:15

## 2018-07-29 RX ADMIN — ASCORBIC ACID, THIAMINE MONONITRATE,RIBOFLAVIN, NIACINAMIDE, PYRIDOXINE HYDROCHLORIDE, FOLIC ACID, CYANOCOBALAMIN, BIOTIN, CALCIUM PANTOTHENATE, SCH CAP: 100; 1.5; 1.7; 20; 10; 1; 6000; 150000; 5 CAPSULE, LIQUID FILLED ORAL at 10:06

## 2018-07-29 RX ADMIN — BUMETANIDE SCH MG: 1 TABLET ORAL at 10:06

## 2018-07-29 RX ADMIN — PANTOPRAZOLE SODIUM SCH MLS/HR: 40 INJECTION, POWDER, FOR SOLUTION INTRAVENOUS at 20:11

## 2018-07-29 RX ADMIN — PIPERACILLIN AND TAZOBACTAM SCH MLS/HR: 3; .375 INJECTION, POWDER, LYOPHILIZED, FOR SOLUTION INTRAVENOUS; PARENTERAL at 14:04

## 2018-07-29 RX ADMIN — INSULIN ASPART SCH UNITS: 100 INJECTION, SOLUTION INTRAVENOUS; SUBCUTANEOUS at 12:20

## 2018-07-29 RX ADMIN — ATORVASTATIN CALCIUM SCH MG: 20 TABLET, FILM COATED ORAL at 20:12

## 2018-07-29 RX ADMIN — SOTALOL HYDROCHLORIDE SCH MG: 80 TABLET ORAL at 10:07

## 2018-07-29 NOTE — FAMILY MEDICINE PROGRESS NOTE
Progress Note


Date of Service


Jul 29, 2018.





Subjective


Pt evaluation today including:  conversation w/ patient, physical exam, chart 

review, lab review, review of inpatient medication list


Pain:  0/10


PO Intake:  tolerating, currently npo for upper and lower scope tomorrow


Voiding:  no voiding problems


Subjective: patient reported feeling significantly better in good spirits this 

am. Eating sleeping and voiding/stooling appropriately. No complaints overnight.





CC: Dark Stools/GI Bleed?Cellulitis





HPI: Patient is a 73 year old male with a past medical history of Diabetes, 

Hypertension, and Protein C Deficiency with 2 previous DVTs in the left leg 

that presents with dark stools x 3 days. The patient states that he first 

noticed his dark stools on Tuesday morning and they have continued to be dark 

throughout the week. The patient states that they have appeared black with 

blood surrounding them in the toilet. He also appreciates his bowel movements 

have been looser than normal. He is normally constipated and had a hard bowel 

movement Monday evening where he had to strain but has not had significant 

bleeding with wiping since. The patient is on Coumadin for a history of 

multiple DVTs and Protein C Deficiency. His last INR this week was around 2 (

patient states does not recall exact number).





They patient has also been on antibiotics this week for a left lower extremity 

cellulitis. His symptoms were initially believed to be secondary to Doxycycline 

use so they switched his antibiotics to Clindamycin. The patient states that 

there was erythema over the dorsum of his foot along with pain when moving his 

foot initially. The pain improved with the Clindamycin but her continues to 

have some residual erythema and swelling of the left lower extremity.





He appreciates his blood pressures over the last 2 weeks have ranged from 170-

220 systolic, and called his PCP who told him to "try to make it better" at 

home.





The patient denies an recent fevers, chills, sweats, abdominal pain, diarrhea, 

vomiting, nausea, or any other acute complaints. He states that he has been 

fatigued but this has been chronic over the last year.





Patient resting comfortably during examination today, no complaints overnight, 

toileting appropriate, npo this a.m. now tolerating diet, sleeping well.





   Constitutional:  No fever, No chills, No sweats


   ENT:  No hearing loss


   Respiratory:  No cough, No sputum, No wheezing, No shortness of breath


   Cardiovascular:  No chest pain


   Abdomen:  No pain, No nausea, No vomiting, No diarrhea


   Psychiatric:  No depression symptoms


   Heme:  No abnormal bleeding/bruising


   Endo:  No fatigue


   Skin:  No rash, No itch


   All Other Systems:  Reviewed and Negative





Medications





Current Inpatient Medications








 Medications


  (Trade)  Dose


 Ordered  Sig/Venu


 Route  Start Time


 Stop Time Status Last Admin


Dose Admin


 


 Ondansetron HCl


  (Zofran Inj)  4 mg  Q6H  PRN


 IV  7/27/18 20:15


 8/26/18 20:14   


 


 


 Insulin Aspart


  (novoLOG ASPART)  **SLIDING


 SCALE**


 **If C...  ACHS


 SC  7/27/18 21:00


 8/26/18 20:59  7/28/18 17:27


40 UNITS


 


 Glucose


  (Glucose 40% Gel)  15-30


 GRAMS 15


 GRAMS...  UD  PRN


 PO  7/27/18 20:15


 8/26/18 20:14   


 


 


 Glucose


  (Glucose Chew


 Tab)  4-8


 Tablets 4


 Tabl...  UD  PRN


 PO  7/27/18 20:15


 8/26/18 20:14   


 


 


 Dextrose


  (Dextrose 50%


 50ML Syringe)  25-50ML


 25ML FOR


 ...  UD  PRN


 IV  7/27/18 20:15


 8/26/18 20:14   


 


 


 Glucagon


  (Glucagon Inj)  1 mg  UD  PRN


 SQ  7/27/18 20:15


 8/26/18 20:14   


 


 


 Carbohydrates


  (Carbohydrates


 For Hypoglycemia)  15-30 GRAMS


 15 grams if


 BSG 54-69...  UD  PRN


 PO  7/27/18 20:15


 8/26/18 20:14   


 


 


 Hydralazine HCl


  (HydrALAZINE INJ)  10 mg  Q4H  PRN


 IV.  7/27/18 20:15


 8/26/18 20:14  7/28/18 05:02


10 MG


 


 Metoprolol


 Tartrate


  (Lopressor Iv)  5 mg  Q4  PRN


 IV  7/27/18 20:15


 8/26/18 20:14  7/28/18 05:36


5 MG


 


 Simethicone


  (Mylicon Chew


 Tab)  80 mg  Q6H


 PO  7/27/18 22:00


 8/26/18 21:59  7/29/18 05:12


80 MG


 


 Miscellaneous


 Information


  (Consult)  1 ea  UD  PRN


 N/A  7/27/18 21:00


 8/26/18 20:59   


 


 


 Amlodipine


 Besylate


  (Norvasc Tab)  10 mg  QPM


 PO  7/28/18 21:00


 8/27/18 20:59  7/28/18 20:54


10 MG


 


 Atorvastatin


 Calcium


  (Lipitor Tab)  20 mg  HS


 PO  7/28/18 21:00


 8/27/18 20:59  7/28/18 20:54


20 MG


 


 Bumetanide


  (Bumex Tab)  2 mg  DAILY


 PO  7/28/18 09:00


 8/27/18 08:59  7/28/18 07:52


2 MG


 


 Carbidopa/Levodopa


  (Sinemet 10/


 100MG Tab)  1 tab  DAILY


 PO  7/28/18 09:00


 8/27/18 08:59  7/28/18 09:42


1 TAB


 


 Fluticasone


 Propionate


  (Flonase Nasal


 Spray)  2 sprays  DAILY  PRN


 ISABEL  7/27/18 21:45


 8/26/18 21:44   


 


 


 Losartan Potassium


  (coZAAR TAB)  100 mg  DAILY


 PO  7/28/18 09:00


 8/27/18 08:59  7/28/18 07:53


100 MG


 


 Terazosin HCl


  (Hytrin Cap)  10 mg  HS


 PO  7/28/18 21:00


 8/27/18 20:59  7/28/18 20:55


10 MG


 


 Vitamin B Complex/


 Vit C/Folic Acid


  (Nephrocaps)  1 cap  DAILY


 PO  7/28/18 09:00


 8/27/18 08:59  7/28/18 07:53


1 CAP


 


 Insulin Glargine


  (Lantus Solostar


 Pen)  65 units  HS


 SC  7/28/18 21:00


 8/27/18 20:59  7/28/18 20:57


65 UNITS


 


 Sotalol HCl


  (Betapace Tab)  120 mg  BID


 PO  7/28/18 09:00


 8/27/18 08:59  7/28/18 20:55


120 MG


 


 Pantoprazole


 Sodium 40 mg/


 Dextrose  100 ml @ 


 20 mls/hr  Q5H


 IV  7/27/18 23:00


 8/26/18 22:59  7/29/18 02:41


20 MLS/HR


 


 Piperacillin Sod/


 Tazobactam Sod


 3.375 gm/Dextrose  115 ml @ 


 28.75 mls/


 hr  Q8


 IV  7/28/18 06:00


 8/7/18 05:59  7/29/18 05:13


28.75 MLS/HR











Objective


Vital Signs











  Date Time  Temp Pulse Resp B/P (MAP) Pulse Ox O2 Delivery O2 Flow Rate FiO2


 


7/29/18 04:27 36.5 74 18 129/81 (97) 93 Room Air  


 


7/29/18 00:19 36.7 76 18 154/71 (98) 95 Room Air  


 


7/28/18 22:23      Room Air  


 


7/28/18 19:08 36.3 80 18 165/79 (107) 96 Room Air  


 


7/28/18 18:00      Room Air  


 


7/28/18 15:13 36.7 74 18 159/83 (108) 97 Room Air  


 


7/28/18 11:44  77  153/78 (103)    


 


7/28/18 08:00     95 Room Air  











Physical Exam


Notes:


General Appearance:  WD/WN, no apparent distress


Eyes:  normal inspection, EOMI, sclerae normal


ENT:  hearing grossly normal


Neck:  supple, no adenopathy, thyroid normal, no JVD, no carotid bruits, 

trachea midline


Respiratory/Chest:  chest non-tender, lungs clear, normal breath sounds, no 

respiratory distress, no accessory muscle use


Cardiovascular:  regular rate, rhythm, no edema, no gallop, no JVD, no murmur, 

+ pertinent finding (reports palpitations)


Abdomen:  normal bowel sounds, non tender, soft, no organomegaly, no pulsatile 

mass, + pertinent finding (reports BRBPR)


Extremities:  non-tender, normal inspection, no calf tenderness, normal 

capillary refill, + pedal edema (1+)


Neurologic/Psychiatric:  alert, normal mood/affect


Skin:  normal color, warm/dry, + rash (Improving )


Lymphatic:  no adenopathy





Laboratory Results


Last Resulted


7/29/18 05:54








Red Blood Count 3.01, Mean Corpuscular Volume 86.0, Mean Corpuscular Hemoglobin 

29.2, Mean Corpuscular Hemoglobin Concent 34.0, Mean Platelet Volume 9.5, 

Neutrophils (%) (Auto) 57.4, Lymphocytes (%) (Auto) 30.8, Monocytes (%) (Auto) 

8.1, Eosinophils (%) (Auto) 2.9, Basophils (%) (Auto) 0.6, Neutrophils # (Auto) 

5.08, Lymphocytes # (Auto) 2.73, Monocytes # (Auto) 0.72, Eosinophils # (Auto) 

0.26, Basophils # (Auto) 0.05





Last Resulted


7/29/18 05:54











Past 24 Hours








Test


  7/29/18


05:54 Range/Units


 


 


Prothromb Time International


Ratio 1.1 


  0.9-1.1  


 


 


Prothrombin Time


  11.4 


  9.0-12.0


SECONDS











Assessment and Plan


Patient is a 73 year old male with a past medical history of Diabetes, 

Hypertension, and Protein C Deficiency with 2 previous DVTs in the left leg 

that presents with dark stools x 3 days





GI Bleeding


- Hemoccult Positive


- Hold Coumadin and Aspirin


- INR 3.2 --> 10mg Vitamin K --> 7/28 INR 1.7


- Repeat H/H q4h --> Current Hgb 10.0


- on clear liquids


- Protonix 40 mg q5h 


- GI Consult 


7/28


   -"GI bleeding---not entirely clear if upper with rapid transit or lower 

bleeding. 


   -Continue PPI. 


   -Seems to have slowed or stopped so unless starts to rapidly bleed, will 

plan EGD and colonoscopy on monday.  


   -Clear liquid diet over the weekend in case urgent scope needed and to prep 

bowel. 


   -Acute blood loss anemia--no baseline in the chart, stable---follow H and H 

and transfuse prn. 


   -coumadin coagulopathy--continue to hold until results of scopes known. 


   -change in bms with constipation--colo monday


   -fhx CRC--colonoscopy monday."


7/29


   "-Continue PPI. 


   -Hgb drop but no frequent stool output to suggest brisk bleeding. 


   -EGD and colonscopy tomorrow. Proc and risks explained to patient which 

include but not limited to med reaction, bleeding, perforation, aspiration, and 

missed lesions. 


   -Acute blood loss anemia--Hgb drop but no active bleeding, follow and 

transfuse prn


   -coumadin coagulopathy--continue to hold until results of scopes known--INR 

1.1 today. 


   -change in bms with constipation--colo tomorrow"





Hypokalemia


   -Repleted with 20 meq q2h x3





Lower Extremity Cellulitis


- Zosyn IV


- Holding previous home PO Clindamycin





Protein C Deficiency with History of DVT


- Hold Coumadin


- Left lower extremity doppler US


   -No evidence of deep venous thrombus within the right lower extremity.





Diabetes


- Hold home Metformin 


- Glargine 65 units qhs 


- Novolog SSI with BSG Checks AC/HS CF:5 CHO Ratio: 1:2 





Hypertension


- Holding home PO Antihypertensives 


- PRN Lopressor and Hydralazine ordered for systolic greater than 180 





Palpitations


-likely ectopy drinks 1/2 gallon of Iced tea per day


-Remain on tele


-Prn EKG for palpitations





HLD


- Hold Lipitor





CKD Stage 3


- Cr of 1.5 -> 1.8


- Hold Bumex





DVT


- SCDs





BPH


- Hold Terazosin 





Code Status 


- Full Resuscitation





Resident Physician Supervision Note:





I interviewed and examined the patient. Discussed with Dr. Colin and agree 

with findings and plan as documented in the note. Any exceptions or 

clarifications are listed here: None





Documented By:  Peterson Daniel


suddenly got sweaty and fatigued - no fevers.  no sob.  thinks sugar dropping


vitals noted fatigued and mildly diaphoretic.  no pallor or icterus.  cardio 

reg no r/m/g, lungs cta b/l no r/r/w good effort.  abd soft nd nt.  no c/c/e





GI bleeding- holding coumadin, follow vitals and Hgb, supportive care, for 

scope.  





diaphoresis - no other findings - suspect pt suspicion of sugar is correct - 

asked nursing to check





otherwise as above





Resident Tracking


Resident Involvement:  Resident Care Provided


Care Provided:  Adult Hospital Medicine

## 2018-07-29 NOTE — GASTROENTEROLOGY PROGRESS NOTE
Progress Note


Date of Service:  Jul 29, 2018


Subjective


Pt evaluation today including:  conversation w/ patient, physical exam, chart 

review, lab review, review of studies, review of inpatient medication list


cc f/u GI bleeding


HPI Per patient and nurse one stool today was this am and black but no red 

noted. No abd pain.





Review of Systems


Respiratory:  No shortness of breath


Cardiac:  No chest pain





Medications





Current Inpatient Medications








 Medications


  (Trade)  Dose


 Ordered  Sig/Venu


 Route  Start Time


 Stop Time Status Last Admin


Dose Admin


 


 Ondansetron HCl


  (Zofran Inj)  4 mg  Q6H  PRN


 IV  7/27/18 20:15


 8/26/18 20:14   


 


 


 Insulin Aspart


  (novoLOG ASPART)  **SLIDING


 SCALE**


 **If C...  ACHS


 SC  7/27/18 21:00


 8/26/18 20:59  7/29/18 12:20


24 UNITS


 


 Glucose


  (Glucose 40% Gel)  15-30


 GRAMS 15


 GRAMS...  UD  PRN


 PO  7/27/18 20:15


 8/26/18 20:14   


 


 


 Glucose


  (Glucose Chew


 Tab)  4-8


 Tablets 4


 Tabl...  UD  PRN


 PO  7/27/18 20:15


 8/26/18 20:14   


 


 


 Dextrose


  (Dextrose 50%


 50ML Syringe)  25-50ML


 25ML FOR


 ...  UD  PRN


 IV  7/27/18 20:15


 8/26/18 20:14   


 


 


 Glucagon


  (Glucagon Inj)  1 mg  UD  PRN


 SQ  7/27/18 20:15


 8/26/18 20:14   


 


 


 Carbohydrates


  (Carbohydrates


 For Hypoglycemia)  15-30 GRAMS


 15 grams if


 BSG 54-69...  UD  PRN


 PO  7/27/18 20:15


 8/26/18 20:14   


 


 


 Hydralazine HCl


  (HydrALAZINE INJ)  10 mg  Q4H  PRN


 IV.  7/27/18 20:15


 8/26/18 20:14  7/28/18 05:02


10 MG


 


 Metoprolol


 Tartrate


  (Lopressor Iv)  5 mg  Q4  PRN


 IV  7/27/18 20:15


 8/26/18 20:14  7/28/18 05:36


5 MG


 


 Simethicone


  (Mylicon Chew


 Tab)  80 mg  Q6H


 PO  7/27/18 22:00


 8/26/18 21:59  7/29/18 05:12


80 MG


 


 Miscellaneous


 Information


  (Consult)  1 ea  UD  PRN


 N/A  7/27/18 21:00


 8/26/18 20:59   


 


 


 Amlodipine


 Besylate


  (Norvasc Tab)  10 mg  QPM


 PO  7/28/18 21:00


 8/27/18 20:59  7/28/18 20:54


10 MG


 


 Atorvastatin


 Calcium


  (Lipitor Tab)  20 mg  HS


 PO  7/28/18 21:00


 8/27/18 20:59  7/28/18 20:54


20 MG


 


 Bumetanide


  (Bumex Tab)  2 mg  DAILY


 PO  7/28/18 09:00


 8/27/18 08:59  7/29/18 10:06


2 MG


 


 Carbidopa/Levodopa


  (Sinemet 10/


 100MG Tab)  1 tab  DAILY


 PO  7/28/18 09:00


 8/27/18 08:59  7/29/18 10:07


1 TAB


 


 Fluticasone


 Propionate


  (Flonase Nasal


 Spray)  2 sprays  DAILY  PRN


 ISABEL  7/27/18 21:45


 8/26/18 21:44   


 


 


 Losartan Potassium


  (coZAAR TAB)  100 mg  DAILY


 PO  7/28/18 09:00


 8/27/18 08:59  7/29/18 10:07


100 MG


 


 Terazosin HCl


  (Hytrin Cap)  10 mg  HS


 PO  7/28/18 21:00


 8/27/18 20:59  7/28/18 20:55


10 MG


 


 Vitamin B Complex/


 Vit C/Folic Acid


  (Nephrocaps)  1 cap  DAILY


 PO  7/28/18 09:00


 8/27/18 08:59  7/29/18 10:06


1 CAP


 


 Insulin Glargine


  (Lantus Solostar


 Pen)  65 units  HS


 SC  7/28/18 21:00


 8/27/18 20:59  7/28/18 20:57


65 UNITS


 


 Sotalol HCl


  (Betapace Tab)  120 mg  BID


 PO  7/28/18 09:00


 8/27/18 08:59  7/29/18 10:07


120 MG


 


 Pantoprazole


 Sodium 40 mg/


 Dextrose  100 ml @ 


 20 mls/hr  Q5H


 IV  7/27/18 23:00


 8/26/18 22:59  7/29/18 10:06


20 MLS/HR


 


 Piperacillin Sod/


 Tazobactam Sod


 3.375 gm/Dextrose  115 ml @ 


 28.75 mls/


 hr  Q8


 IV  7/28/18 06:00


 8/7/18 05:59  7/29/18 14:04


28.75 MLS/HR


 


 Lactated Ringer's  1,000 ml @ 


 75 mls/hr  [id number]


 IV  7/29/18 08:30


 8/28/18 08:29  7/29/18 10:12


75 MLS/HR











Objective


Vital Signs











  Date Time  Temp Pulse Resp B/P (MAP) Pulse Ox O2 Delivery O2 Flow Rate FiO2


 


7/29/18 12:27 36.5 74 22 135/61 (85) 96 Room Air  


 


7/29/18 08:58 36.7 80 17 160/77 (104) 95 Room Air  


 


7/29/18 08:00      Room Air  


 


7/29/18 04:27 36.5 74 18 129/81 (97) 93 Room Air  


 


7/29/18 00:19 36.7 76 18 154/71 (98) 95 Room Air  


 


7/28/18 22:23      Room Air  


 


7/28/18 19:08 36.3 80 18 165/79 (107) 96 Room Air  


 


7/28/18 18:00      Room Air  











Physical Exam


General Appearance:  WD/WN, no apparent distress


Respiratory/Chest:  lungs clear, no respiratory distress


Cardiovascular:  regular rate, rhythm, no edema


Abdomen:  normal bowel sounds, non tender, soft, no organomegaly





Laboratory Results





Last 24 Hours








Test


  7/28/18


16:13 7/28/18


20:31 7/29/18


05:54 7/29/18


07:35


 


Bedside Glucose 270 mg/dl  160 mg/dl   145 mg/dl 


 


White Blood Count   8.86 K/uL  


 


Red Blood Count   3.01 M/uL  


 


Hemoglobin   8.8 g/dL  


 


Hematocrit   25.9 %  


 


Mean Corpuscular Volume   86.0 fL  


 


Mean Corpuscular Hemoglobin   29.2 pg  


 


Mean Corpuscular Hemoglobin


Concent 


  


  34.0 g/dl 


  


 


 


Platelet Count   266 K/uL  


 


Mean Platelet Volume   9.5 fL  


 


Neutrophils (%) (Auto)   57.4 %  


 


Lymphocytes (%) (Auto)   30.8 %  


 


Monocytes (%) (Auto)   8.1 %  


 


Eosinophils (%) (Auto)   2.9 %  


 


Basophils (%) (Auto)   0.6 %  


 


Neutrophils # (Auto)   5.08 K/uL  


 


Lymphocytes # (Auto)   2.73 K/uL  


 


Monocytes # (Auto)   0.72 K/uL  


 


Eosinophils # (Auto)   0.26 K/uL  


 


Basophils # (Auto)   0.05 K/uL  


 


RDW Standard Deviation   43.7 fL  


 


RDW Coefficient of Variation   14.0 %  


 


Immature Granulocyte % (Auto)   0.2 %  


 


Immature Granulocyte # (Auto)   0.02 K/uL  


 


Red Blood Cell Morphology   Unremarkable  


 


Prothrombin Time   11.4 SECONDS  


 


Prothromb Time International


Ratio 


  


  1.1 


  


 


 


Sodium Level   139 mmol/L  


 


Potassium Level   3.3 mmol/L  


 


Chloride Level   106 mmol/L  


 


Carbon Dioxide Level   23 mmol/L  


 


Anion Gap   10.0 mmol/L  


 


Blood Urea Nitrogen   19 mg/dl  


 


Creatinine   1.80 mg/dl  


 


Est Creatinine Clear Calc


Drug Dose 


  


  46.3 ml/min 


  


 


 


Estimated GFR (


American) 


  


  41.7 


  


 


 


Estimated GFR (Non-


American 


  


  36.0 


  


 


 


BUN/Creatinine Ratio   10.6  


 


Random Glucose   120 mg/dl  


 


Calcium Level   8.3 mg/dl  


 


Total Bilirubin   1.1 mg/dl  


 


Aspartate Amino Transf


(AST/SGOT) 


  


  16 U/L 


  


 


 


Alanine Aminotransferase


(ALT/SGPT) 


  


  20 U/L 


  


 


 


Alkaline Phosphatase   80 U/L  


 


Total Protein   6.2 gm/dl  


 


Albumin   2.5 gm/dl  


 


Globulin   3.7 gm/dl  


 


Albumin/Globulin Ratio   0.7  


 


Test


  7/29/18


11:33 


  


  


 


 


Bedside Glucose 212 mg/dl    











Assessment and Plan


GI bleeding---not entirely clear if upper with rapid transit or lower bleeding. 

Continue PPI. Hgb drop but no frequent stool output to suggest brisk bleeding. 


    EGD and colonscopy tomorrow. Proc and risks explained to patient which 

include but not limited to med reaction, bleeding, perforation, aspiration, and 

missed lesions. 





Acute blood loss anemia--Hgb drop but no active bleeding, follow and transfuse 

prn





coumadin coagulopathy--continue to hold until results of scopes known--INR 1.1 

today. 





change in bms wiht constipation--colo tomorrow





fhx CRC--colonoscopy tomorrow

## 2018-07-30 VITALS — SYSTOLIC BLOOD PRESSURE: 160 MMHG | HEART RATE: 73 BPM | DIASTOLIC BLOOD PRESSURE: 78 MMHG

## 2018-07-30 VITALS
HEART RATE: 72 BPM | SYSTOLIC BLOOD PRESSURE: 173 MMHG | TEMPERATURE: 98.06 F | DIASTOLIC BLOOD PRESSURE: 84 MMHG | OXYGEN SATURATION: 96 %

## 2018-07-30 VITALS
DIASTOLIC BLOOD PRESSURE: 84 MMHG | OXYGEN SATURATION: 96 % | HEART RATE: 72 BPM | SYSTOLIC BLOOD PRESSURE: 173 MMHG | TEMPERATURE: 98.06 F

## 2018-07-30 VITALS
TEMPERATURE: 98.42 F | SYSTOLIC BLOOD PRESSURE: 172 MMHG | OXYGEN SATURATION: 96 % | DIASTOLIC BLOOD PRESSURE: 82 MMHG | HEART RATE: 79 BPM

## 2018-07-30 VITALS
DIASTOLIC BLOOD PRESSURE: 83 MMHG | SYSTOLIC BLOOD PRESSURE: 168 MMHG | OXYGEN SATURATION: 95 % | TEMPERATURE: 99.14 F | HEART RATE: 70 BPM

## 2018-07-30 LAB
ALBUMIN SERPL-MCNC: 2.6 GM/DL (ref 3.4–5)
ALP SERPL-CCNC: 83 U/L (ref 45–117)
ALT SERPL-CCNC: 21 U/L (ref 12–78)
AST SERPL-CCNC: 16 U/L (ref 15–37)
BASOPHILS # BLD: 0.02 K/UL (ref 0–0.2)
BASOPHILS NFR BLD: 0.2 %
BUN SERPL-MCNC: 12 MG/DL (ref 7–18)
CALCIUM SERPL-MCNC: 8.3 MG/DL (ref 8.5–10.1)
CO2 SERPL-SCNC: 27 MMOL/L (ref 21–32)
CREAT SERPL-MCNC: 1.65 MG/DL (ref 0.6–1.4)
EOS ABS #: 0.17 K/UL (ref 0–0.5)
EOSINOPHIL NFR BLD AUTO: 289 K/UL (ref 130–400)
GLUCOSE SERPL-MCNC: 101 MG/DL (ref 70–99)
HCT VFR BLD CALC: 26.1 % (ref 42–52)
HGB BLD-MCNC: 8.7 G/DL (ref 14–18)
IG#: 0.03 K/UL (ref 0–0.02)
IMM GRANULOCYTES NFR BLD AUTO: 32 %
INR PPP: 1 (ref 0.9–1.1)
LYMPHOCYTES # BLD: 2.71 K/UL (ref 1.2–3.4)
MCH RBC QN AUTO: 28.7 PG (ref 25–34)
MCHC RBC AUTO-ENTMCNC: 33.3 G/DL (ref 32–36)
MCV RBC AUTO: 86.1 FL (ref 80–100)
MONO ABS #: 0.58 K/UL (ref 0.11–0.59)
MONOCYTES NFR BLD: 6.8 %
NEUT ABS #: 4.97 K/UL (ref 1.4–6.5)
NEUTROPHILS # BLD AUTO: 2 %
NEUTROPHILS NFR BLD AUTO: 58.6 %
PMV BLD AUTO: 9.3 FL (ref 7.4–10.4)
POTASSIUM SERPL-SCNC: 3.4 MMOL/L (ref 3.5–5.1)
PROT SERPL-MCNC: 6.5 GM/DL (ref 6.4–8.2)
RED CELL DISTRIBUTION WIDTH CV: 13.9 % (ref 11.5–14.5)
RED CELL DISTRIBUTION WIDTH SD: 43.5 FL (ref 36.4–46.3)
SODIUM SERPL-SCNC: 143 MMOL/L (ref 136–145)
WBC # BLD AUTO: 8.48 K/UL (ref 4.8–10.8)

## 2018-07-30 PROCEDURE — 0DJ08ZZ INSPECTION OF UPPER INTESTINAL TRACT, VIA NATURAL OR ARTIFICIAL OPENING ENDOSCOPIC: ICD-10-PCS | Performed by: INTERNAL MEDICINE

## 2018-07-30 PROCEDURE — 0DJD8ZZ INSPECTION OF LOWER INTESTINAL TRACT, VIA NATURAL OR ARTIFICIAL OPENING ENDOSCOPIC: ICD-10-PCS | Performed by: INTERNAL MEDICINE

## 2018-07-30 RX ADMIN — BUMETANIDE SCH MG: 1 TABLET ORAL at 08:35

## 2018-07-30 RX ADMIN — CARBIDOPA AND LEVODOPA SCH TAB: 10; 100 TABLET ORAL at 08:36

## 2018-07-30 RX ADMIN — PIPERACILLIN AND TAZOBACTAM SCH MLS/HR: 3; .375 INJECTION, POWDER, LYOPHILIZED, FOR SOLUTION INTRAVENOUS; PARENTERAL at 13:23

## 2018-07-30 RX ADMIN — PANTOPRAZOLE SODIUM SCH MLS/HR: 40 INJECTION, POWDER, FOR SOLUTION INTRAVENOUS at 05:15

## 2018-07-30 RX ADMIN — ASCORBIC ACID, THIAMINE MONONITRATE,RIBOFLAVIN, NIACINAMIDE, PYRIDOXINE HYDROCHLORIDE, FOLIC ACID, CYANOCOBALAMIN, BIOTIN, CALCIUM PANTOTHENATE, SCH CAP: 100; 1.5; 1.7; 20; 10; 1; 6000; 150000; 5 CAPSULE, LIQUID FILLED ORAL at 08:35

## 2018-07-30 RX ADMIN — PANTOPRAZOLE SODIUM SCH MLS/HR: 40 INJECTION, POWDER, FOR SOLUTION INTRAVENOUS at 10:28

## 2018-07-30 RX ADMIN — INSULIN ASPART SCH UNITS: 100 INJECTION, SOLUTION INTRAVENOUS; SUBCUTANEOUS at 18:00

## 2018-07-30 RX ADMIN — INSULIN ASPART SCH UNITS: 100 INJECTION, SOLUTION INTRAVENOUS; SUBCUTANEOUS at 05:40

## 2018-07-30 RX ADMIN — SIMETHICONE SCH MG: 80 TABLET, CHEWABLE ORAL at 05:15

## 2018-07-30 RX ADMIN — PIPERACILLIN AND TAZOBACTAM SCH MLS/HR: 3; .375 INJECTION, POWDER, LYOPHILIZED, FOR SOLUTION INTRAVENOUS; PARENTERAL at 05:15

## 2018-07-30 RX ADMIN — LOSARTAN POTASSIUM SCH MG: 50 TABLET, FILM COATED ORAL at 08:35

## 2018-07-30 RX ADMIN — SODIUM CHLORIDE, SODIUM LACTATE, POTASSIUM CHLORIDE, AND CALCIUM CHLORIDE SCH MLS/HR: 600; 310; 30; 20 INJECTION, SOLUTION INTRAVENOUS at 01:19

## 2018-07-30 RX ADMIN — INSULIN ASPART SCH UNITS: 100 INJECTION, SOLUTION INTRAVENOUS; SUBCUTANEOUS at 12:00

## 2018-07-30 RX ADMIN — SIMETHICONE SCH MG: 80 TABLET, CHEWABLE ORAL at 08:36

## 2018-07-30 RX ADMIN — SIMETHICONE SCH MG: 80 TABLET, CHEWABLE ORAL at 17:56

## 2018-07-30 RX ADMIN — SOTALOL HYDROCHLORIDE SCH MG: 80 TABLET ORAL at 08:35

## 2018-07-30 RX ADMIN — PANTOPRAZOLE SODIUM SCH MLS/HR: 40 INJECTION, POWDER, FOR SOLUTION INTRAVENOUS at 01:19

## 2018-07-30 NOTE — GI REPORT
Patient Name: Nghia Martin

Procedure Date: 7/30/2018 3:59 PM

MRN: [id number]

Account Number: [id number]

YOB: 1945

Admit Type: Inpatient

Age: 73

Gender: Male

Attending MD: Yaakov Crain MD

Procedure:            Colonoscopy

Providers:            Yaakov Crain MD

Referring MD:         Lexi Thomas

Indications:          Iron deficiency anemia

Medicines:            Propofol total dose 200 mg IV, Lidocaine 40 mg IV

Complications:        No immediate complications.

Estimated Blood Loss: Estimated blood loss: none.

Procedure:            Pre-Anesthesia Assessment:

                      - Prior to the procedure, a History and Physical was 

                      performed, and patient medications, allergies and 

                      sensitivities were reviewed. The patient's tolerance of 

                      previous anesthesia was reviewed.

                      - The risks and benefits of the procedure and the 

                      sedation options and risks were discussed with the 

                      patient. All questions were answered and informed 

                      consent was obtained.

                      After I obtained informed consent, the scope was passed 

                      under direct vision. Throughout the procedure, the 

                      patient's blood pressure, pulse, and oxygen saturations 

                      were monitored continuously. The scope was introduced 

                      through the anus and advanced to the terminal ileum. 

                      The colonoscopy was performed without difficulty. The 

                      patient tolerated the procedure well. The quality of 

                      the bowel preparation was fair.

Findings:

     A few diverticula were found in the sigmoid colon and ascending colon.

     Non-bleeding internal hemorrhoids were found during endoscopy. The 

     hemorrhoids were mild.

Impression:           - Preparation of the colon was fair.

                      - Diverticulosis in the sigmoid colon and in the 

                      ascending colon.

                      - Non-bleeding internal hemorrhoids.

                      - No specimens collected.

Recommendation:       - [facility] patient to [facility] for [facility] care.

Yaakov Crain M.D.

Yaakov Crain MD

7/30/2018 4:28:48 PM

This report has been signed electronically.

Note Initiated On: 7/30/2018 3:59 PM

Number of Addenda: 0

     I attest to the content of the Intraoperative Record and orders 

     documented therein, exceptions below



{[id number]}

## 2018-07-30 NOTE — CLINICAL DOCUMENTATION QUERY
**** CLINICAL DOCUMENTATION QUERY****



73 year old maleon Warfarin therapy who presents to the Emergency Room with complaints of 
intermittent black stools.



In your clinical opinion is this patient being managed for:

    

                        (  ) GIB due to Warfarin therapy treated with IV Vitamin K+ and 
daily CBC's

                        (  ) Not Agree



                        (  ) Other explanation of clinical findings (No explanation is 
considered a No Response)

                        (  ) Unable to determine 

                        (  ) Need to Discuss (Phone CDS or qliq) (No discussion is 
considered a No Response)

                                             

The medical record reflects the following clinical findings, treatment, and risk factors.  
  



Clinical Indicators: Hgb 8.5, Hct 25.5, INR 3.2, Tarry stools, and near syncope



Treatment: IV Vitamin K+, Warfarin on hold, IV Protonix gtt, 



Risk Factors: Age, Warfarin therapy



Please clarify and document your clinical opinion in the progress notes and discharge 
summary. Terms such as "probable", "suspected", "likely", "questionable", "possible", or 
"still to be ruled out" are acceptable. 



*****IF IN AGREEMENT, YOU MUST DOCUMENT ABOVE DIAGNOSTIC STATEMENT IN DAILY PROGRESS NOTES 
AND DISCHARGE SUMMARY. This document is not part of the patient's record.*****



Thank You, Mario Lau RN  [phone] & via qlicCONNECT

## 2018-07-30 NOTE — ENDO HISTORY AND PHYSICAL
History & Physical


Date of Service:


Jul 30, 2018.


Chief Complaint:


anemia


Referring Physician:


Dr Jesus


History of Present Illness


For EGD and colonoscopy





Past Medical History


Diabetes, Arthritis, Pulmonary Emboli, High Cholesterol, Hypertension, 

Thrombophlebitis





Past Surgical History


Hx Cardiac Surgery:  Yes (CARDIAC ABLATION FOR ATRIAL FLUTTER)


Hx Abdominal Surgery:  No


Hx Post-Op Nausea and Vomiting:  Yes (NAUSEA WITH ABLATION)


Hx Cancer Surgery:  No


Hx Thoracic Surgery:  No


Hx Orthopedic:  Yes (LUMB SURG X 2,LUMBAR FUSION-06/2015)


Hx Urinary Tract Surgery:  No





Social History


Smoking Status:  Never Smoker


Smokeless Tobacco Use:  No


Hx Substance Use:  No


Hx Alcohol Use:  No





Allergies


Coded Allergies:  


     Penicillins (Verified  Allergy, Unknown, RASH FROM INJECTED FORM ONLY, po 

OK, 12/14/15)


 PT CAN TOLERATE ALL ORAL PCN MEDS


     Pravastatin (Unverified  Adverse Reaction, Unknown, LEGS GET WEAK, 12/14/15

)





Current Medications





Reported Home Medications








 Medications  Dose


 Route/Sig


 Max Daily Dose Days Date Category Dose


Instructions


 


 Jantoven


  (Warfarin Sodium)


 5 Mg Tab  2.5 Mg


 PO 4XWK


    7/27/18 Reported  TAKE SAT, SUN, TUES, THUR


 


 Jantoven


  (Warfarin Sodium)


 5 Mg Tab  5 Mg


 PO MWF


    7/27/18 Reported 


 


 Tylenol


  (Acetaminophen)


 500 Mg Tab  1-2 Tabs


 PO DAILY


    7/27/18 Reported 


 


 Toujeo Solostar


  (Insulin


 Glargine) 300


 Unit/Ml Inj  65-75 Units


 SQ HS


    7/27/18 Reported 


 


 Hytrin (Terazosin


 HCl) 10 Mg Cap  10 Mg


 PO HS


    7/27/18 Reported 


 


 Sotalol Hcl 120


 Mg Tab  120 Mg


 PO BID


    7/27/18 Reported 


 


 Novolog Flexpen


  (Insulin Aspart)


 100 Units/Ml Inj  35 Units


 SQ BIDM


    7/27/18 Reported  TAKE LUNCH & SUPPER PLUS SLIDING SCALE.


 


 Novolog Flexpen


  (Insulin Aspart)


 100 Units/Ml Inj  30 Units


 SQ QAM


    7/27/18 Reported  PLUS SLIDING SCALE. TAKE WITH BREAKFAST


 


 Cozaar (Losartan


 Potassium) 100 Mg


 Tab  100 Mg


 PO DAILY


    7/27/18 Reported 


 


 Norco 5MG/325MG


  (Acetaminophen/Hydrocodone


 Bitart)  Tab  1-2 Tabs


 PO QID PRN


    7/27/18 Reported  PRN PAIN


 


 Ginkgo Biloba 120


 Mg Tab  120 Mg


 PO DAILY


    7/27/18 Reported 


 


 Coq10 (Coenzyme


 Q10


  (Ubidecarenone))


 200 Mg Cap  200 Mg


 QAM


    7/27/18 Reported 


 


 Clindamycin HCl


 300 Mg Cap  300 Mg


 PO QID


   10 7/27/18 Reported  START 7/26/18


 


 Bumetanide 1 Mg


 Tab  2 Mg


 PO DAILY


    7/27/18 Reported 


 


 Nephrocaps


  (Vitamin B


 Complex/Vit


 C/Folic Acid)  Cap  1 Cap


 PO DAILY


    7/27/18 Reported 


 


 Lipitor


  (Atorvastatin


 Calcium) 20 Mg Tab  20 Mg


 PO HS


    7/27/18 Reported 


 


 Aspirin Ec


  (Aspirin) 81 Mg


 Tab  81 Mg


 PO HOLD


    7/27/18 Reported 


 


 Carbidopa/Levodopa


  (Carbidopa-Levodopa)


 1 Tab Tab  1 Tab


 PO DAILY


    7/27/18 Reported 


 


 Flonase Allergy


 Relief


  (Fluticasone


 Propionate


  (Nasal)) 50


 Mcg/Act Spr  2 Sprays


 ISABEL DAILY PRN


    7/27/18 Reported 


 


 [Fluocinonide


 Sol]  1 Dose


 TOP PRN


    12/1/15 Reported  0.05% SOL


 


 Multi Vitamin


 Daily (Multiple


 Vitamin) 1 Tab Tab  1 Tab


 PO QAM


    4/28/15 Reported 


 


 Metformin HCl 500


 Mg Tab  500 Mg


 PO BID


    4/28/15 Reported 


 


 Norvasc


  (Amlodipine


 Besylate) 10 Mg


 Tab  10 Mg


 PO QPM


    4/28/15 Reported 











Vital Signs


Weight (Kilograms):  110.900


Height (Feet):  6


Height (Inches):  1.00











  Date Time  Temp Pulse Resp B/P (MAP) Pulse Ox O2 Delivery O2 Flow Rate FiO2


 


7/30/18 15:36 37.1 73 20 175/94 (121) 96 Room Air  


 


7/30/18 15:02 37.3 70 20 168/83 (111) 95   


 


7/30/18 11:15  73  160/78 (105)    


 


7/30/18 08:00      Room Air  


 


7/30/18 07:21 36.9 79 18 172/82 (112) 96 Room Air  


 


7/29/18 23:45      Room Air  


 


7/29/18 23:11 36.9 76 18 146/84 (104) 97 Room Air  


 


7/29/18 18:42    156/76 (102)    


 


7/29/18 17:53 36.4 73 20 169/72 (104) 98 Room Air  


 


7/29/18 17:50      Room Air  


 


7/29/18 17:36 36.8 62 18  98   











Physical Exam


General Appearance:  WD/WN


Respiratory/Chest:  


   Respiratory effort:  no dyspnea


Cardiovascular:  


   Heart Auscultation:  RRR


Abdomen:  


   Inspection & Palpation:  soft





Assessment and Plan


Anemia for EGD and colonoscopy

## 2018-07-30 NOTE — ANESTHESIOLOGY PROGRESS NOTE
Anesthesia Post Op Note


Date & Time


Jul 30, 2018 at 16:46





Vital Signs


Pain Intensity:  0.0





Vital Signs Past 12 Hours








  Date Time  Temp Pulse Resp B/P (MAP) Pulse Ox O2 Delivery O2 Flow Rate FiO2


 


7/30/18 16:24  71 16 138/63 (88) 95 Room Air  


 


7/30/18 15:36 37.1 73 20 175/94 (121) 96 Room Air  


 


7/30/18 15:02 37.3 70 20 168/83 (111) 95   


 


7/30/18 11:15  73  160/78 (105)    


 


7/30/18 08:00      Room Air  


 


7/30/18 07:21 36.9 79 18 172/82 (112) 96 Room Air  











Notes


Mental Status:  alert / awake / arousable, participated in evaluation


Pt Amnestic to Procedure:  Yes


Nausea / Vomiting:  adequately controlled


Pain:  adequately controlled


Airway Patency, RR, SpO2:  stable & adequate


BP & HR:  stable & adequate


Hydration State:  stable & adequate


Anesthetic Complications:  no major complications apparent

## 2018-07-30 NOTE — DISCHARGE INSTRUCTIONS
Discharge Instructions


Date of Service


Jul 30, 2018.





Admission


Reason for Admission:  Bleeding On Coumadin, Gi Bleed





Discharge


Discharge Diagnosis / Problem:  Resolved GI bleed of unknown source





Discharge Goals


Goal(s):  Learn about illness, Prevent Disease Progression





Activity Recommendations


Activity Limitations:  resume your previous activity





.





Instructions / Follow-Up


Instructions / Follow-Up


Mr. Martin, you are being discharged from [facility] for a GI 

bleed. You are being discharged on all of your home meds EXCEPT your warfarin (

Coumadin). Please refrain from taking your coumadin until you have met with 

your primary care provider. Your cellulitis appears to be resolving well. You 

can finish your course of clindamycin at home. Please follow up with your 

primary care physician in the next 7 days. If you notice further dark stools or 

blood please inform your primary care physician. If you notice large amounts of 

blood loss, return to the hospital or call 911.





Current Hospital Diet


Patient's current hospital diet: Diabetes Type 2 Diet





Discharge Diet


Recommended Diet:  Diabetes Type 2 Diet


Fluid Restriction:  None





Procedures


Procedures Performed:  


Colonoscopy & EGD





Pending Studies


Studies pending at discharge:  no





Laboratory Results





Hemoglobin A1c








Test


  7/3/18


09:06 Range/Units


 


 


Estimated Average Glucose 180   mg/dl


 


Hemoglobin A1c 7.9 H 4.5-5.6  %








Lipid Panel








Test


  7/3/18


09:06 Range/Units


 


 


Triglycerides Level 115  0-150  mg/dl


 


Cholesterol Level 111  0-200  mg/dl


 


HDL Cholesterol 34   mg/dl


 


Cholesterol/HDL Ratio 3.3   


 


LDL Cholesterol, Calculated 54   mg/dl











Medical Emergencies








.


Who to Call and When:





Medical Emergencies:  If at any time you feel your situation is an emergency, 

please call 911 immediately.





.





Non-Emergent Contact


Non-Emergency issues call your:  Primary Care Provider, Gastroenterologist





.





Past History


Medical & Surgical History:  


(1) GI bleed


(2) Bleeding on Coumadin


(3) Cellulitis of right foot


.








"Provider Documentation" section prepared by Napoleon Carbajal.








.





Resident Tracking


Resident Involvement:  Resident Care Provided


Care Provided:  Adult Hospital Medicine

## 2018-07-30 NOTE — DISCHARGE SUMMARY
Discharge Summary


Date of Service


Jul 30, 2018.





Discharge Summary


Admission Date:


Jul 27, 2018 at 20:26


Discharge Date:  Jul 30, 2018


Discharge Disposition:  Home


Principal Diagnosis:  Resolved GI Bleed


Immunizations:  


   Have You Had Influenza Vaccine:  Unknown


   History of Tetanus Vaccine?:  Unknown


   History of Pneumococcal:  Unknown


   History of Hepatitis B Vaccine:  Unknown


Procedures:


Colonoscopy and EGD


Consultations:


GI consulted performed EGD and Colonoscopy and recommends Capsule study 

outpatient to further evaluate small bowel.





Medication Reconciliation


Continued Medications:  


Acetaminophen (Tylenol) 500 Mg Tab


1-2 TABS PO DAILY, TAB





Amlodipine (Norvasc) 10 Mg Tab


10 MG PO QPM, TAB





Aspirin (Aspirin Ec) 81 Mg Tab


81 MG PO HOLD





Atorvastatin (Lipitor) 20 Mg Tab


20 MG PO HS, TAB





Bumetanide (Bumetanide) 1 Mg Tab


2 MG PO DAILY





Carbidopa-Levodopa (Carbidopa/Levodopa) 1 Tab Tab


1 TAB PO DAILY





Clindamycin HCl (Clindamycin HCl) 300 Mg Cap


300 MG PO QID for 10 Days


START 7/26/18


Coenzyme Q10 (Ubidecarenone) (Coq10) 200 Mg Cap


200 MG QAM





Fluticasone Propionate (Nasal) (Flonase Allergy Relief) 50 Mcg/Act Spr


2 SPRAYS ISABEL DAILY PRN for Nasal Congestion





Ginkgo Biloba (Ginkgo Biloba) 120 Mg Tab


120 MG PO DAILY





Hydrocodone/Acetaminophen 5MG/325MG (Norco 5MG/325MG)  Tab


1-2 TABS PO QID PRN for Pain, TAB


PRN PAIN


Insulin Aspart (Novolog Flexpen) 100 Units/Ml Inj


30 UNITS SQ QAM


PLUS SLIDING SCALE. TAKE WITH BREAKFAST


Insulin Aspart (Novolog Flexpen) 100 Units/Ml Inj


35 UNITS SQ BIDM


TAKE LUNCH & SUPPER PLUS SLIDING SCALE.


Insulin Glargine (Toujeo Solostar) 300 Unit/Ml Inj


65-75 UNITS SQ HS





Losartan Potassium (Cozaar) 100 Mg Tab


100 MG PO DAILY





Metformin HCl (Metformin HCl) 500 Mg Tab


500 MG PO BID





Multiple Vitamin (Multi Vitamin Daily) 1 Tab Tab


1 TAB PO QAM





Sotalol Hcl (Sotalol Hcl) 120 Mg Tab


120 MG PO BID





Terazosin Hcl (Hytrin) 10 Mg Cap


10 MG PO HS





Vitamin B Cmplx/Vitc/Folic Ac (Nephrocaps)  Cap


1 CAP PO DAILY, CAP





[Fluocinonide Sol] ()  


1 DOSE TOP PRN


0.05% SOL


 


Discontinued Medications:  


Warfarin Sod (Jantoven) 5 Mg Tab


5 MG PO MWF, TAB





Warfarin Sod (Jantoven) 5 Mg Tab


2.5 MG PO 4XWK, TAB


TAKE SAT, SUN, TUES, THUR








Discharge Exam


patient resting comfortably in bed, no issues overnight no complaints.


Review of Systems:  


   Constitutional:  No fever, No chills, No weakness, No fatigue


   Respiratory:  No cough, No shortness of breath


   Cardiovascular:  No chest pain, No orthopnea, No palpitations


   Abdomen:  No pain, No nausea, No vomiting, No diarrhea, No constipation, No 

GI bleeding


Physical Exam:  


   General Appearance:  WD/WN, no apparent distress


   Respiratory/Chest:  chest non-tender, lungs clear, normal breath sounds, no 

respiratory distress, no accessory muscle use


   Cardiovascular:  regular rate, rhythm, no edema, no gallop, no JVD, no murmur

, normal peripheral pulses


   Abdomen / GI:  normal bowel sounds, non tender, soft, no organomegaly, no 

pulsatile mass


   Extremities:  normal inspection, no calf tenderness


   Neurologic/Psychiatric:  oriented x 3


   Skin:  normal color, warm/dry, no rash





Hospital Course


07/27


Patient is a 73 year old male with a past medical history of Diabetes, 

Hypertension, and Protein C Deficiency (Anticoagulated with Warfarin) with 2 

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 The 

patient states that he first noticed his dark stools on Tuesday morning and 

they have continued to be dark throughout the week. The patient stated that 

they appeared black with blood surrounding them in the toilet. 





His last INR this week was around 2 (patient states does not recall exact number

).





The patient had also been on antibiotics for a left lower extremity cellulitis. 

His symptoms were initially believed to be secondary to Doxycycline use so they 

switched his antibiotics to Clindamycin. The patient states that there was 

erythema over the dorsum of his foot along with pain when moving his foot 

initially. The pain had improved with the Clindamycin.





Patient was admitted and made NPO. Coumadin and aspirin were held, INR was 3.2 

on admission and he was given 10 mg vitamin K. Started on pantoprazole.





07/28


Hemoglobin remained stable, no more active bleeding


GI was consulted for scopes


placed on clear liquid diet





07/29 


H and H remained stable


Had an episode of hypoglycemia diaphoretic and shaky. was given carbohydrates 

and resolved





07/30


Had EGD and colonoscopy. 


   No bleeding source identified.


D/C to home with home meds with the exception of coumadin


   Holding coumadin until appointment with Primary Care


Total Time Spent:  Greater than 30 minutes (45)


This includes examination of the patient, discharge planning, medication 

reconciliation, and communication with other providers.





Discharge Instructions


Please refer to the electronic Patient Visit Report (Discharge Instructions) 

for additional information.





Resident Tracking


Resident Involvement:  Resident Care Provided


Care Provided:  Adult [facility]





Reviewed:  Pt Seen/Exam by Me


History


denies any further bleeding.


Constitutional:  denies: fever


Respiratory:  negative: short of breath


Cardiovascular:  denies chest pain


General Appearance:  no apparent distress


Respiratory:  lungs clear, no respiratory distress


Cardiovascular:  regular rate, rhythm


Neurologic/Psychiatric:  alert, oriented x 3


Skin Characteristics:  warm/dry


Assessment/Plan


Resident Physician Supervision Note:


I independently interviewed and examined the patient and verified the key 

history and physical, reviewed labs and image studies, discussed the case with 

the resident Dr. Carbajal and agree with the findings and care plan. 





Consider starting anticoagulation due to h/o PE and protein C def, if continues 

to have no bleeding while waiting to see PCP. Will contact him via phone to 

check. 


Time spent in discharge 35 min

## 2018-07-30 NOTE — DISCHARGE INSTRUCTIONS
Endoscopy Patient Instructions


Date / Procedure(s) Performed


Jul 30, 2018.


Colonoscopy, EGD





Allergy Information


Coded Allergies:  


     Penicillins (Verified  Allergy, Unknown, RASH FROM INJECTED FORM ONLY, po 

OK, 12/14/15)


 PT CAN TOLERATE ALL ORAL PCN MEDS


     Pravastatin (Unverified  Adverse Reaction, Unknown, LEGS GET WEAK, 12/14/15

)





Discharge Date / Findings


Jul 30, 2018.


diverticulosis, hemorrhoids





Medication Instructions


Restart Stopped Medication(s):


resume meds





Current Inpatient Medications








 Medications


  (Trade)  Dose


 Ordered  Sig/Venu


 Route  Start Time


 Stop Time Status Last Admin


Dose Admin


 


 Ondansetron HCl


  (Zofran Inj)  4 mg  Q6H  PRN


 IV  7/27/18 20:15


 8/26/18 20:14   


 


 


 Glucose


  (Glucose 40% Gel)  15-30


 GRAMS 15


 GRAMS...  UD  PRN


 PO  7/27/18 20:15


 8/26/18 20:14   


 


 


 Glucose


  (Glucose Chew


 Tab)  4-8


 Tablets 4


 Tabl...  UD  PRN


 PO  7/27/18 20:15


 8/26/18 20:14   


 


 


 Dextrose


  (Dextrose 50%


 50ML Syringe)  25-50ML


 25ML FOR


 ...  UD  PRN


 IV  7/27/18 20:15


 8/26/18 20:14   


 


 


 Glucagon


  (Glucagon Inj)  1 mg  UD  PRN


 SQ  7/27/18 20:15


 8/26/18 20:14   


 


 


 Carbohydrates


  (Carbohydrates


 For Hypoglycemia)  15-30 GRAMS


 15 grams if


 BSG 54-69...  UD  PRN


 PO  7/27/18 20:15


 8/26/18 20:14  7/29/18 15:23


15 GM


 


 Hydralazine HCl


  (HydrALAZINE INJ)  10 mg  Q4H  PRN


 IV.  7/27/18 20:15


 8/26/18 20:14  7/28/18 05:02


10 MG


 


 Metoprolol


 Tartrate


  (Lopressor Iv)  5 mg  Q4  PRN


 IV  7/27/18 20:15


 8/26/18 20:14  7/28/18 05:36


5 MG


 


 Simethicone


  (Mylicon Chew


 Tab)  80 mg  Q6H


 PO  7/27/18 22:00


 8/26/18 21:59  7/30/18 08:36


80 MG


 


 Miscellaneous


 Information


  (Consult)  1 ea  UD  PRN


 N/A  7/27/18 21:00


 8/26/18 20:59   


 


 


 Amlodipine


 Besylate


  (Norvasc Tab)  10 mg  QPM


 PO  7/28/18 21:00


 8/27/18 20:59  7/29/18 20:12


10 MG


 


 Atorvastatin


 Calcium


  (Lipitor Tab)  20 mg  HS


 PO  7/28/18 21:00


 8/27/18 20:59  7/29/18 20:12


20 MG


 


 Bumetanide


  (Bumex Tab)  2 mg  DAILY


 PO  7/28/18 09:00


 8/27/18 08:59  7/30/18 08:35


2 MG


 


 Carbidopa/Levodopa


  (Sinemet 10/


 100MG Tab)  1 tab  DAILY


 PO  7/28/18 09:00


 8/27/18 08:59  7/30/18 08:36


1 TAB


 


 Fluticasone


 Propionate


  (Flonase Nasal


 Spray)  2 sprays  DAILY  PRN


 ISABEL  7/27/18 21:45


 8/26/18 21:44   


 


 


 Losartan Potassium


  (coZAAR TAB)  100 mg  DAILY


 PO  7/28/18 09:00


 8/27/18 08:59  7/30/18 08:35


100 MG


 


 Terazosin HCl


  (Hytrin Cap)  10 mg  HS


 PO  7/28/18 21:00


 8/27/18 20:59  7/29/18 20:12


10 MG


 


 Vitamin B Complex/


 Vit C/Folic Acid


  (Nephrocaps)  1 cap  DAILY


 PO  7/28/18 09:00


 8/27/18 08:59  7/30/18 08:35


1 CAP


 


 Insulin Glargine


  (Lantus Solostar


 Pen)  65 units  HS


 SC  7/28/18 21:00


 8/27/18 20:59  7/29/18 20:18


65 UNITS


 


 Sotalol HCl


  (Betapace Tab)  120 mg  BID


 PO  7/28/18 09:00


 8/27/18 08:59  7/30/18 08:35


120 MG


 


 Pantoprazole


 Sodium 40 mg/


 Dextrose  100 ml @ 


 20 mls/hr  Q5H


 IV  7/27/18 23:00


 8/26/18 22:59  7/30/18 10:28


20 MLS/HR


 


 Piperacillin Sod/


 Tazobactam Sod


 3.375 gm/Dextrose  115 ml @ 


 28.75 mls/


 hr  Q8


 IV  7/28/18 06:00


 8/7/18 05:59  7/30/18 13:23


28.75 MLS/HR


 


 Insulin Aspart


  (novoLOG ASPART)  **SLIDING


 SCALE**


 **If C...  Q6


 SC  7/30/18 00:00


 8/29/18 00:00  7/29/18 23:47


3 UNITS


 


 Potassium


 Chloride/Sodium


 Chloride  1,000 ml @ 


 125 mls/hr  Q8H


 IV  7/30/18 07:00


 8/29/18 06:59  7/30/18 08:34


125 MLS/HR











Provider Instructions





Activity Restrictions





-  No exercising or heavy lifting for 24 hours. 


-  Do not drink alcohol the day of the procedure.


-  Do not drive a car or operate machinery until the day after the procedure.


-  Do not make any important decisions or sign important papers in 24 hours 

after the procedure.





Following Day:





-  Return to full activity which may include returning to work/school.





Diet





Start your diet with liquids and light foods (jello, soup, juice, toast).  Then 

eat your usual diet if not nauseated.





Treatment For Common After Affects





For mild abdominal pain, bloating, or excessive gas:





-  Rest


-  Eat lightly


-  Lie on right side





Follow-Up Information


Follow-up with  as scheduled





Anesthesia Information





What You Should Know





You have had a procedure that required some medicine to reduce anxiety and 

discomfort. This treatment is called moderate sedation.  


After receiving the treatment, you may be sleepy, but you will be able to 

breathe on your own.  The effects of the treatment may last for several hours.








Follow these instructions along with Activity/Diet recommendations noted above:





*  Do NOT do anything where dizziness or clumsiness would be dangerous.





*  Rest quietly at home today, then you can be up and about tomorrow.





*  Have a responsible person stay with you the rest of today.





*  You may have had an I.V. today.  If so, you may take the dressing off later 

today.





Recommendations


 


Call your doctor if:





*  Trouble breathing 





*  Continuous vomiting for more than 24 hours








*  Temperature above 101 degrees





*  Severe abdominal pain or bloating





*  Pain not relieved by pain medicine ordered





*  There is increased drainage or redness from any incision





*  A large amount of rectal bleeding greater than 2-3 tablespoons. 


   (If you had a polyp/s removed or have hemorrhoids, a small amount of blood -


    from the rectum is to be expected.)





*  You have any unanswered questions or concerns.








IN THE EVENT OF A SERIOUS EMERGENCY, GO TO THE NEAREST EMERGENCY ROOM








       Your discharge instructions were prepared by provider Yaakov Crain.





 Patient Instructions Signature Page














Nghia Martin 











Patient (or Guardian) Signature/Date:____________________________________ I 

have read and understand the instructions given to me by my caregivers.








Caregiver/RN/Doctor Signature/Date:____________________________________











The above-named patient and/or guardian has received patient instructions on 

this date.





























+  Original Patient Signature Page (only) stays with chart.  Please make copy 

for patient.

## 2018-07-30 NOTE — GI REPORT
Patient Name: Nghia Martin

Procedure Date: 7/30/2018 4:01 PM

MRN: [id number]

Account Number: [id number]

YOB: 1945

Admit Type: Inpatient

Age: 73

Gender: Male

Attending MD: Yaakov Crain MD

Procedure:            Upper GI endoscopy

Providers:            Yaakov Crain MD

Referring MD:         Fabiano Jesus

Indications:          Iron deficiency anemia

Medicines:            Propofol total dose 200 mg IV, Lidocaine 40 mg IV

Complications:        No immediate complications.

Estimated Blood Loss: Estimated blood loss: none.

Procedure:            Pre-Anesthesia Assessment:

                      - Prior to the procedure, a History and Physical was 

                      performed, and patient medications, allergies and 

                      sensitivities were reviewed. The patient's tolerance of 

                      previous anesthesia was reviewed.

                      - The risks and benefits of the procedure and the 

                      sedation options and risks were discussed with the 

                      patient. All questions were answered and informed 

                      consent was obtained.

                      After obtaining informed consent, the endoscope was 

                      passed under direct vision. Throughout the procedure, 

                      the patient's blood pressure, pulse, and oxygen 

                      saturations were monitored continuously. The On-site 

                      loaner was introduced through the mouth, and advanced 

                      to the second part of duodenum. The upper GI endoscopy 

                      was accomplished without difficulty. The patient 

                      tolerated the procedure well.

Findings:

     The Z-line was regular and was found 47 cm from the incisors.

     The examined esophagus was normal.

     The entire examined stomach was normal.

     A non-bleeding diverticulum was found in the second portion of the 

     duodenum.

Impression:           - Z-line regular, 47 cm from the incisors.

                      - Normal esophagus.

                      - Normal stomach.

                      - Non-bleeding duodenal diverticulum.

                      - No specimens collected.

Recommendation:       - [facility] patient to [facility] for [facility] care.

Yaakov Crain M.D.

Yaakov Crain MD

7/30/2018 4:26:35 PM

This report has been signed electronically.

Note Initiated On: 7/30/2018 4:01 PM

Number of Addenda: 0

     I attest to the content of the Intraoperative Record and orders 

     documented therein, exceptions below



{[id number]}

## 2018-07-30 NOTE — FAMILY MEDICINE PROGRESS NOTE
Progress Note


Date of Service


Jul 30, 2018.





Subjective


Pt evaluation today including:  conversation w/ patient, physical exam, chart 

review, lab review, review of studies


Pain:  0/10


PO Intake:  NPO


Voiding:  no voiding problems


Very pleasant 73 year old man being seen for a suspected GI bleed. He has been 

NPO since midnight in preparation for EGD and colonoscopy today. He is 

currently in no distress and reports his last bowel movement was not dark, but 

had two small flecks of baltazar blood. Patient has no questions or complaints at 

this time.





   Constitutional:  No fever, No chills, No sweats, No fatigue


   Respiratory:  No cough, No sputum, No shortness of breath


   Cardiovascular:  No chest pain


   Abdomen:  + GI bleeding, No vomiting, No constipation


   Musculoskeletal:  No joint pain, No muscle pain


   Male :  No dysuria, No urinary frequency


   Neurologic:  No memory loss, No weakness


   Additional Comments:


Patient reports his RLL cellulitis is much improved.





Medications





Current Inpatient Medications








 Medications


  (Trade)  Dose


 Ordered  Sig/Venu


 Route  Start Time


 Stop Time Status Last Admin


Dose Admin


 


 Ondansetron HCl


  (Zofran Inj)  4 mg  Q6H  PRN


 IV  7/27/18 20:15


 8/26/18 20:14   


 


 


 Glucose


  (Glucose 40% Gel)  15-30


 GRAMS 15


 GRAMS...  UD  PRN


 PO  7/27/18 20:15


 8/26/18 20:14   


 


 


 Glucose


  (Glucose Chew


 Tab)  4-8


 Tablets 4


 Tabl...  UD  PRN


 PO  7/27/18 20:15


 8/26/18 20:14   


 


 


 Dextrose


  (Dextrose 50%


 50ML Syringe)  25-50ML


 25ML FOR


 ...  UD  PRN


 IV  7/27/18 20:15


 8/26/18 20:14   


 


 


 Glucagon


  (Glucagon Inj)  1 mg  UD  PRN


 SQ  7/27/18 20:15


 8/26/18 20:14   


 


 


 Carbohydrates


  (Carbohydrates


 For Hypoglycemia)  15-30 GRAMS


 15 grams if


 BSG 54-69...  UD  PRN


 PO  7/27/18 20:15


 8/26/18 20:14  7/29/18 15:23


15 GM


 


 Hydralazine HCl


  (HydrALAZINE INJ)  10 mg  Q4H  PRN


 IV.  7/27/18 20:15


 8/26/18 20:14  7/28/18 05:02


10 MG


 


 Metoprolol


 Tartrate


  (Lopressor Iv)  5 mg  Q4  PRN


 IV  7/27/18 20:15


 8/26/18 20:14  7/28/18 05:36


5 MG


 


 Simethicone


  (Mylicon Chew


 Tab)  80 mg  Q6H


 PO  7/27/18 22:00


 8/26/18 21:59  7/30/18 08:36


80 MG


 


 Miscellaneous


 Information


  (Consult)  1 ea  UD  PRN


 N/A  7/27/18 21:00


 8/26/18 20:59   


 


 


 Amlodipine


 Besylate


  (Norvasc Tab)  10 mg  QPM


 PO  7/28/18 21:00


 8/27/18 20:59  7/29/18 20:12


10 MG


 


 Atorvastatin


 Calcium


  (Lipitor Tab)  20 mg  HS


 PO  7/28/18 21:00


 8/27/18 20:59  7/29/18 20:12


20 MG


 


 Bumetanide


  (Bumex Tab)  2 mg  DAILY


 PO  7/28/18 09:00


 8/27/18 08:59  7/30/18 08:35


2 MG


 


 Carbidopa/Levodopa


  (Sinemet 10/


 100MG Tab)  1 tab  DAILY


 PO  7/28/18 09:00


 8/27/18 08:59  7/30/18 08:36


1 TAB


 


 Fluticasone


 Propionate


  (Flonase Nasal


 Spray)  2 sprays  DAILY  PRN


 ISABEL  7/27/18 21:45


 8/26/18 21:44   


 


 


 Losartan Potassium


  (coZAAR TAB)  100 mg  DAILY


 PO  7/28/18 09:00


 8/27/18 08:59  7/30/18 08:35


100 MG


 


 Terazosin HCl


  (Hytrin Cap)  10 mg  HS


 PO  7/28/18 21:00


 8/27/18 20:59  7/29/18 20:12


10 MG


 


 Vitamin B Complex/


 Vit C/Folic Acid


  (Nephrocaps)  1 cap  DAILY


 PO  7/28/18 09:00


 8/27/18 08:59  7/30/18 08:35


1 CAP


 


 Insulin Glargine


  (Lantus Solostar


 Pen)  65 units  HS


 SC  7/28/18 21:00


 8/27/18 20:59  7/29/18 20:18


65 UNITS


 


 Sotalol HCl


  (Betapace Tab)  120 mg  BID


 PO  7/28/18 09:00


 8/27/18 08:59  7/30/18 08:35


120 MG


 


 Pantoprazole


 Sodium 40 mg/


 Dextrose  100 ml @ 


 20 mls/hr  Q5H


 IV  7/27/18 23:00


 8/26/18 22:59  7/30/18 10:28


20 MLS/HR


 


 Piperacillin Sod/


 Tazobactam Sod


 3.375 gm/Dextrose  115 ml @ 


 28.75 mls/


 hr  Q8


 IV  7/28/18 06:00


 8/7/18 05:59  7/30/18 05:15


28.75 MLS/HR


 


 Insulin Aspart


  (novoLOG ASPART)  **SLIDING


 SCALE**


 **If C...  Q6


 SC  7/30/18 00:00


 8/29/18 00:00  7/29/18 23:47


3 UNITS


 


 Potassium


 Chloride/Sodium


 Chloride  1,000 ml @ 


 125 mls/hr  Q8H


 IV  7/30/18 07:00


 8/29/18 06:59  7/30/18 08:34


125 MLS/HR











Objective


Vital Signs











  Date Time  Temp Pulse Resp B/P (MAP) Pulse Ox O2 Delivery O2 Flow Rate FiO2


 


7/30/18 08:00      Room Air  


 


7/30/18 07:21 36.9 79 18 172/82 (112) 96 Room Air  


 


7/29/18 23:45      Room Air  


 


7/29/18 23:11 36.9 76 18 146/84 (104) 97 Room Air  


 


7/29/18 18:42    156/76 (102)    


 


7/29/18 17:53 36.4 73 20 169/72 (104) 98 Room Air  


 


7/29/18 17:50      Room Air  


 


7/29/18 17:36 36.8 62 18  98   


 


7/29/18 15:50 36.8 62 18 136/72 (93) 98 Room Air  


 


7/29/18 15:30      Room Air  


 


7/29/18 12:27 36.5 74 22 135/61 (85) 96 Room Air  











Physical Exam


General Appearance:  WD/WN, no apparent distress, + obese


Eyes:  EOMI, sclerae normal


ENT:  hearing grossly normal


Respiratory/Chest:  chest non-tender, lungs clear, normal breath sounds, no 

respiratory distress, no accessory muscle use


Cardiovascular:  regular rate, rhythm, no gallop, no murmur


Abdomen:  normal bowel sounds, non tender, soft, no organomegaly, no pulsatile 

mass





Laboratory Results


7/30/18 05:48








Red Blood Count 3.03, Mean Corpuscular Volume 86.1, Mean Corpuscular Hemoglobin 

28.7, Mean Corpuscular Hemoglobin Concent 33.3, Mean Platelet Volume 9.3, 

Neutrophils (%) (Auto) 58.6, Lymphocytes (%) (Auto) 32.0, Monocytes (%) (Auto) 

6.8, Eosinophils (%) (Auto) 2.0, Basophils (%) (Auto) 0.2, Neutrophils # (Auto) 

4.97, Lymphocytes # (Auto) 2.71, Monocytes # (Auto) 0.58, Eosinophils # (Auto) 

0.17, Basophils # (Auto) 0.02





7/30/18 05:48

















Test


  7/30/18


05:48 7/30/18


11:19


 


White Blood Count


  8.48 K/uL


(4.8-10.8) 


 


 


Red Blood Count


  3.03 M/uL


(4.7-6.1) 


 


 


Hemoglobin


  8.7 g/dL


(14.0-18.0) 


 


 


Hematocrit 26.1 % (42-52)  


 


Mean Corpuscular Volume


  86.1 fL


() 


 


 


Mean Corpuscular Hemoglobin


  28.7 pg


(25-34) 


 


 


Mean Corpuscular Hemoglobin


Concent 33.3 g/dl


(32-36) 


 


 


Platelet Count


  289 K/uL


(130-400) 


 


 


Mean Platelet Volume


  9.3 fL


(7.4-10.4) 


 


 


Neutrophils (%) (Auto) 58.6 %  


 


Lymphocytes (%) (Auto) 32.0 %  


 


Monocytes (%) (Auto) 6.8 %  


 


Eosinophils (%) (Auto) 2.0 %  


 


Basophils (%) (Auto) 0.2 %  


 


Neutrophils # (Auto)


  4.97 K/uL


(1.4-6.5) 


 


 


Lymphocytes # (Auto)


  2.71 K/uL


(1.2-3.4) 


 


 


Monocytes # (Auto)


  0.58 K/uL


(0.11-0.59) 


 


 


Eosinophils # (Auto)


  0.17 K/uL


(0-0.5) 


 


 


Basophils # (Auto)


  0.02 K/uL


(0-0.2) 


 


 


RDW Standard Deviation


  43.5 fL


(36.4-46.3) 


 


 


RDW Coefficient of Variation


  13.9 %


(11.5-14.5) 


 


 


Immature Granulocyte % (Auto) 0.4 %  


 


Immature Granulocyte # (Auto)


  0.03 K/uL


(0.00-0.02) 


 


 


Red Blood Cell Morphology Unremarkable  


 


Prothrombin Time


  11.0 SECONDS


(9.0-12.0) 


 


 


Prothromb Time International


Ratio 1.0 (0.9-1.1) 


  


 


 


Anion Gap


  9.0 mmol/L


(3-11) 


 


 


Est Creatinine Clear Calc


Drug Dose 50.5 ml/min 


  


 


 


Estimated GFR (


American) 46.4 


  


 


 


Estimated GFR (Non-


American 40.0 


  


 


 


BUN/Creatinine Ratio 7.4 (10-20)  


 


Calcium Level


  8.3 mg/dl


(8.5-10.1) 


 


 


Total Bilirubin


  0.9 mg/dl


(0.2-1) 


 


 


Aspartate Amino Transf


(AST/SGOT) 16 U/L (15-37) 


  


 


 


Alanine Aminotransferase


(ALT/SGPT) 21 U/L (12-78) 


  


 


 


Alkaline Phosphatase


  83 U/L


() 


 


 


Total Protein


  6.5 gm/dl


(6.4-8.2) 


 


 


Albumin


  2.6 gm/dl


(3.4-5.0) 


 


 


Globulin


  3.9 gm/dl


(2.5-4.0) 


 


 


Albumin/Globulin Ratio 0.7 (0.9-2)  


 


Bedside Glucose


  


  150 mg/dl


(70-99)











Assessment and Plan


GI BLEED


-Holding coumadin and aspirin


   INR at 1.0


-EGD and Colonoscopy to be performed today to look for source


-Hemoglobin is stable 9.2-8.7 continue to monitor daily





RLL Cellulitis


-Resolving on Zosyn, swelling pain and redness have dissipated.





DM


-On insulin sliding scale, well controlled today (115-150)





HTN


-Holding home PO antihypertensives


-Has not required PRN antihypertensives





CKD III


-Creatinine down to 1.6 from 1.8 likely 2/2 dehydration.





BPH


-On home terazosin





DVT PPx


-Using compression devices 


-Hope to restart coumadin as soon as possible due to patients history of 

protein c deficiency.





FEN: PO fluids now that EGD and colonoscopy completed





Code Status: Full Resuscitation





Resident Tracking


Resident Involvement:  Resident Care Provided


Care Provided:  Adult [facility]

## 2018-07-30 NOTE — PROGRESS NOTE
DATE: 07/30/2018

 

SUBJECTIVE:  The patient underwent an EGD and colonoscopy today for rectal

bleeding and anemia.  His upper endoscopy was normal except for incidental

duodenal diverticulum which was small in the second portion.  His colonoscopy

showed a few scattered diverticula throughout the colon and some internal

hemorrhoids which were mild.  No polyps, cancers or other source of blood

loss were identified.

 

IMPRESSION:  The patient has rectal bleeding, possibly from hemorrhoids.  I

recommend that as an outpatient, the patient will be considered for a small

bowel video capsule procedure.

## 2018-07-31 NOTE — PROGRESS NOTE
Progress Note


Date of Service


Jul 31, 2018.





Progress Note


Called Nghia today, so far no more dark tarry stools.





He has PCP follow up with Fabiano Jesus next Wednesday.  This is the earliest he 

can arrange because Fabiano is on vacation.  He doesn't think he can get in to see 

someone else earlier because they are very busy in Fabiano's absence.  





He has had two blood clots in 20 years, has been on Coumadin for over 20 years.

  He understands the warning signs of developing a PE.  





I advised that he have a discussion with Fabiano Jesus about restarting Coumadin, 

might need to be bridge with Lovenox. Pt states he's done this in the past. I 

also advised that Nghia go ahead and schedule a follow up with Dr. Crain for a 

capsule endoscopy - Nghia stated he was waiting to speak to Fabiano before doing 

so.  Nghia is also monitoring his stools for any new signs of GI bleed.





In my estimation, continuing to hold Coumadin until the completion of the 

workup for an acute GI bleed (ie capsule endoscopy) is a reasonable approach.  

We need to know that anticoagulation is safe.  Patient does have follow up 

planned with PCP.


Resident Involvement:  On Call Coverage Note


Care Provided:  Adult Hospital Medicine

## 2018-08-07 NOTE — EDITING REQUIRED CODING QUERY
CODING QUERY



To promote full compliance with coding requirements relating to patient care, provider 
participation is requested in all cases of  uncertainty.  Please assist us with the 
question(s) below:



Coding Question(s):    



 Patient admitted with upper gastrointestinal bleed. On Long term Coumadin.  EGD and 
Colonoscopy negative.  Please document, if known or suspected, the etiology of the GI 
bleed.  Thank you! Mason Goode [facility] 







Physician's Response(s): GI bleeding likely secondary to hemorrhoids. 









Principal Diagnosis: "_that condition established after study, to be chiefly responsible 
for occasioning the admission of the patient to the hospital for care."

Co-Existing Principal Diagnosis:  "_when two or more diagnoses equally meet the criteria 
for principal diagnosis as determined by the circumstances of admission, diagnostic work 
up, and/or therapy provided, and the Alphabetic Index, Tabular List, or another coding 
guideline does not provide sequencing direction, any one of the diagnoses may be sequenced 
first."

"When the physician has documented what appears to be a current diagnosis in the body of 
the record, but has not included the diagnosis in the final diagnostic statement, the 
physician should be asked whether the diagnosis should be added."

(Source [facility]oding [facility] QTR90. p3-4)

## 2018-08-08 ENCOUNTER — HOSPITAL ENCOUNTER (OUTPATIENT)
Dept: HOSPITAL 45 - C.LABMFLN | Age: 73
Discharge: HOME | End: 2018-08-08
Attending: FAMILY MEDICINE
Payer: COMMERCIAL

## 2018-08-08 DIAGNOSIS — D50.9: Primary | ICD-10-CM

## 2018-08-08 LAB
HCT VFR BLD CALC: 29.6 % (ref 42–52)
HGB BLD-MCNC: 9.7 G/DL (ref 14–18)
TRANSFERRIN SERPL-MCNC: 227 MG/DL (ref 200–360)

## 2018-08-17 ENCOUNTER — HOSPITAL ENCOUNTER (OUTPATIENT)
Dept: HOSPITAL 45 - C.LABMFLN | Age: 73
Discharge: HOME | End: 2018-08-17
Attending: FAMILY MEDICINE
Payer: COMMERCIAL

## 2018-08-17 DIAGNOSIS — I48.91: Primary | ICD-10-CM

## 2018-08-17 DIAGNOSIS — D50.9: ICD-10-CM

## 2018-08-17 LAB — INR PPP: 1 (ref 0.9–1.1)
